# Patient Record
Sex: FEMALE | Race: WHITE | NOT HISPANIC OR LATINO | Employment: FULL TIME | URBAN - METROPOLITAN AREA
[De-identification: names, ages, dates, MRNs, and addresses within clinical notes are randomized per-mention and may not be internally consistent; named-entity substitution may affect disease eponyms.]

---

## 2018-06-15 ENCOUNTER — OFFICE VISIT - RIVER FALLS (OUTPATIENT)
Dept: FAMILY MEDICINE | Facility: CLINIC | Age: 43
End: 2018-06-15

## 2018-06-15 ASSESSMENT — MIFFLIN-ST. JEOR: SCORE: 1222.52

## 2018-06-18 ENCOUNTER — AMBULATORY - RIVER FALLS (OUTPATIENT)
Dept: FAMILY MEDICINE | Facility: CLINIC | Age: 43
End: 2018-06-18

## 2018-09-08 ENCOUNTER — OFFICE VISIT - RIVER FALLS (OUTPATIENT)
Dept: FAMILY MEDICINE | Facility: CLINIC | Age: 43
End: 2018-09-08

## 2018-09-15 ENCOUNTER — OFFICE VISIT - RIVER FALLS (OUTPATIENT)
Dept: FAMILY MEDICINE | Facility: CLINIC | Age: 43
End: 2018-09-15

## 2019-07-18 ENCOUNTER — AMBULATORY - RIVER FALLS (OUTPATIENT)
Dept: FAMILY MEDICINE | Facility: CLINIC | Age: 44
End: 2019-07-18

## 2019-07-29 ENCOUNTER — OFFICE VISIT - RIVER FALLS (OUTPATIENT)
Dept: FAMILY MEDICINE | Facility: CLINIC | Age: 44
End: 2019-07-29

## 2019-07-29 ASSESSMENT — MIFFLIN-ST. JEOR: SCORE: 1287.83

## 2019-09-25 ENCOUNTER — AMBULATORY - RIVER FALLS (OUTPATIENT)
Dept: FAMILY MEDICINE | Facility: CLINIC | Age: 44
End: 2019-09-25

## 2019-12-30 ENCOUNTER — COMMUNICATION - RIVER FALLS (OUTPATIENT)
Dept: FAMILY MEDICINE | Facility: CLINIC | Age: 44
End: 2019-12-30

## 2020-01-30 ENCOUNTER — AMBULATORY - RIVER FALLS (OUTPATIENT)
Dept: FAMILY MEDICINE | Facility: CLINIC | Age: 45
End: 2020-01-30

## 2021-08-12 ENCOUNTER — OFFICE VISIT - RIVER FALLS (OUTPATIENT)
Dept: FAMILY MEDICINE | Facility: CLINIC | Age: 46
End: 2021-08-12

## 2021-08-21 ENCOUNTER — OFFICE VISIT - RIVER FALLS (OUTPATIENT)
Dept: FAMILY MEDICINE | Facility: CLINIC | Age: 46
End: 2021-08-21

## 2022-02-12 VITALS
HEART RATE: 60 BPM | DIASTOLIC BLOOD PRESSURE: 64 MMHG | BODY MASS INDEX: 23.92 KG/M2 | WEIGHT: 135 LBS | SYSTOLIC BLOOD PRESSURE: 106 MMHG | HEIGHT: 63 IN | TEMPERATURE: 97 F

## 2022-02-12 VITALS
DIASTOLIC BLOOD PRESSURE: 60 MMHG | HEART RATE: 69 BPM | OXYGEN SATURATION: 98 % | WEIGHT: 147 LBS | SYSTOLIC BLOOD PRESSURE: 100 MMHG | RESPIRATION RATE: 16 BRPM

## 2022-02-12 VITALS
OXYGEN SATURATION: 98 % | WEIGHT: 141.6 LBS | TEMPERATURE: 98.7 F | DIASTOLIC BLOOD PRESSURE: 70 MMHG | HEART RATE: 76 BPM | SYSTOLIC BLOOD PRESSURE: 112 MMHG | SYSTOLIC BLOOD PRESSURE: 104 MMHG | TEMPERATURE: 97.7 F | DIASTOLIC BLOOD PRESSURE: 60 MMHG | BODY MASS INDEX: 25.28 KG/M2 | HEART RATE: 71 BPM

## 2022-02-12 VITALS
DIASTOLIC BLOOD PRESSURE: 71 MMHG | HEART RATE: 68 BPM | WEIGHT: 150.8 LBS | TEMPERATURE: 98.2 F | BODY MASS INDEX: 26.93 KG/M2 | OXYGEN SATURATION: 97 % | SYSTOLIC BLOOD PRESSURE: 114 MMHG

## 2022-02-12 VITALS
HEART RATE: 66 BPM | DIASTOLIC BLOOD PRESSURE: 64 MMHG | BODY MASS INDEX: 26.47 KG/M2 | SYSTOLIC BLOOD PRESSURE: 96 MMHG | WEIGHT: 149.4 LBS | HEIGHT: 63 IN | TEMPERATURE: 98.3 F

## 2022-02-15 NOTE — PROGRESS NOTES
Patient:   JENNIFER CAST            MRN: [id number]            FIN: [id number]               Age:   43 years     Sex:  Female     :  1975   Associated Diagnoses:   Pre-employment examination   Author:   Penelope Turner      Subjective   Chief complaint 6/15/2018 4:02 PM CDT    Pt here for pre-employment px- RFSD, TB test.  .     see copy of h and P      Health Status   Allergies:    Allergic Reactions (Selected)  Severity Not Documented  Apples (Throat swelling, eye swelling)  Hercules (Throat swelling, eye swelling)  Peaches (Throat swelling, eye swelling)   Medications:  (Selected)   Documented Medications  Documented  Multiple Vitamins oral capsule: 1 cap(s), PO, Daily, # 90 cap(s), 0 Refill(s), Type: Maintenance  Probiotic Formula (Bacillus Coagulans) oral capsule: 1 cap(s), po, daily, 0 Refill(s), Type: Maintenance  Remicade 100 mg intravenous injection: iv, 0 Refill(s), Type: Maintenance  azaTHIOprine: daily, 0 Refill(s), Type: Maintenance  calcium (as calcium citrate) 250 mg oral tablet: 1 tab(s) ( 250 mg ), po, bid, 0 Refill(s), Type: Maintenance   Problem list:    No problem items selected or recorded.      Objective   Vital Signs   6/15/2018 4:02 PM CDT Temperature Tympanic 97.0 DegF  LOW    Peripheral Pulse Rate 60 bpm    Pulse Site Radial artery    HR Method Manual    Systolic Blood Pressure 106 mmHg    Diastolic Blood Pressure 64 mmHg    Mean Arterial Pressure 78 mmHg    BP Site Right arm    BP Method Manual      Measurements from flowsheet : Measurements   6/15/2018 4:02 PM CDT Height Measured - Standard 62.75 in    Weight Measured - Standard 135.0 lb    BSA 1.64 m2    Body Mass Index 24.1 kg/m2         Impression and Plan   Assessment and Plan:       Diagnosis: Pre-employment examination ([id number]-UC Z02.1).

## 2022-02-15 NOTE — LETTER
(Inserted Image. Unable to display)     January 20, 2020      JENNIFER CAST  [address], [place] [id number]          Dear JENNIFER,      Thank you for selecting [facility] (previously [facility]) for your healthcare needs.      Our records indicate you are due for the following services:     Immunizations:  Hep A #2      Hep B #3      To schedule an appointment or if you have further questions, please contact your primary clinic:   [facility]       [phone]   [facility]       [phone]              [facility]     [phone]      Powered by [employer] and [employer]    Sincerely,    Delta Clifton MD

## 2022-02-15 NOTE — PROGRESS NOTES
Patient:   JENNIFER CAST            MRN: [id number]            FIN: [id number]               Age:   44 years     Sex:  Female     :  1975   Associated Diagnoses:   Soft corn   Author:   Clarke Torre PA-C      Chief Complaint   2019 2:19 PM CDT    Pt c/o plantar's wart on bottom of right foot x awhile.        History of Present Illness   Chief complaint and symptoms noted above and confirmed with patient   intermittent pain on bottom of right foot,  thinks it may be a plantar wart      Health Status   Allergies:    Allergic Reactions (All)  Severity Not Documented  Apples (Throat swelling, eye swelling)  Hercules (Throat swelling, eye swelling)  Peaches (Throat swelling, eye swelling)   Medications:  (Selected)   Prescriptions  Prescribed  Atrovent 42 mcg/inh nasal spray: 2 spray(s), nasal, qid, PRN: for nasal congestion, # 1 EA, 2 Refill(s), Type: Maintenance, Pharm[id number], 2 spray(s) Nasal qid,PRN:for nasal congestion  Documented Medications  Documented  Melatonin 1 mg oral tablet: = 1 tab(s) ( 1 mg ), Oral, hs, 0 Refill(s), Type: Maintenance  Multiple Vitamins oral capsule: 1 cap(s), PO, Daily, # 90 cap(s), 0 Refill(s), Type: Maintenance  Remicade 100 mg intravenous injection: iv, 0 Refill(s), Type: Maintenance  Vitamin D3: 1 tab, Oral, daily, 0 Refill(s), Type: Maintenance  azaTHIOprine: daily, 0 Refill(s), Type: Maintenance  calcium (as calcium citrate) 250 mg oral tablet: 1 tab(s) ( 250 mg ), po, bid, 0 Refill(s), Type: Maintenance   Problem list:    All Problems  Crohns disease / SNOMED CT 65361936 / Confirmed      Histories   Past Medical History:    No active or resolved past medical history items have been selected or recorded.   Family History:    No family history items have been selected or recorded.   Procedure history:    No active procedure history items have been selected or recorded.      Physical Examination   Vital Signs   2019 2:19 PM CDT Temperature  Tympanic 98.3 DegF    Peripheral Pulse Rate 66 bpm    Systolic Blood Pressure 96 mmHg    Diastolic Blood Pressure 64 mmHg    Mean Arterial Pressure 75 mmHg    BP Site Right arm    BP Method Electronic      Measurements from flowsheet : Measurements   7/29/2019 2:19 PM CDT Height Measured - Standard 62.75 in    Weight Measured - Standard 149.4 lb    BSA 1.73 m2    Body Mass Index 26.67 kg/m2  HI      General:  No acute distress.    Integumentary:  on bottom of right heal, there is a small callous present, this is debrided with a 15 blade to reveal a small soft corn.       Impression and Plan   Diagnosis     Soft corn ([id number] L84).     Summary:  advised to use a donut pad to relieve the pressure, if not effective can use salicylic acid plaster  follow up in clinic for more aggressive treatment if needed.    Orders     Orders   Charges (Evaluation and Management):  53308 office outpatient visit 15 minutes (Charge) (Order): Quantity: 1, Soft corn.

## 2022-02-15 NOTE — PROGRESS NOTES
"   Patient:   JENNIFER CAST            MRN: [id number]            FIN: [id number]               Age:   43 years     Sex:  Female     :  1975   Associated Diagnoses:   Eustachian tube dysfunction   Author:   Clarke Torre PA-C      Chief Complaint   2018 8:06 AM CDT     Left ear feels plugged, causing increased headaches.  Ongoing x 1 week.      History of Present Illness   Chief complaint as above reviewed with patient. She reports 1 week of left ear \"plugged\" sensation without pain. No drainage. No right ear pain or fullness. Notes increasing intermittent frontal headaches x 1 week as well. 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 No visual changes, nausea, vomiting, photosensitivity.       Review of Systems   Constitutional:  Negative except as documented in history of present illness.    Eye:  Negative.    Ear/Nose/Mouth/Throat:  Negative except as documented in history of present illness.    Respiratory:  Negative.    Cardiovascular:  Negative.    Gastrointestinal:  Negative.    Musculoskeletal:  Negative.    Integumentary:  Negative.    Neurologic:  Negative.    Psychiatric:  Negative.       Health Status   Allergies:    Allergic Reactions (Selected)  Severity Not Documented  Apples (Throat swelling, eye swelling)  Hercules (Throat swelling, eye swelling)  Peaches (Throat swelling, eye swelling)   Medications:  (Selected)   Documented Medications  Documented  Multiple Vitamins oral capsule: 1 cap(s), PO, Daily, # 90 cap(s), 0 Refill(s), Type: Maintenance  Probiotic Formula (Bacillus Coagulans) oral capsule: 1 cap(s), po, daily, 0 Refill(s), Type: Maintenance  Remicade 100 mg intravenous injection: iv, 0 Refill(s), Type: Maintenance  azaTHIOprine: daily, 0 Refill(s), Type: Maintenance  calcium (as calcium citrate) 250 mg oral tablet: 1 tab(s) ( 250 mg ), po, bid, 0 Refill(s), Type: Maintenance   Problem list:    All Problems  Crohns disease / SNOMED CT 19810584 / Confirmed    "   Histories   Past Medical History:    No active or resolved past medical history items have been selected or recorded.   Family History:    No family history items have been selected or recorded.   Procedure history:    No active procedure history items have been selected or recorded.   Social History:             No active social history items have been recorded.      Physical Examination   Vital Signs   9/8/2018 8:06 AM CDT Temperature Tympanic 97.7 DegF  LOW    Peripheral Pulse Rate 71 bpm    Systolic Blood Pressure 104 mmHg    Diastolic Blood Pressure 60 mmHg    Mean Arterial Pressure 75 mmHg    Oxygen Saturation 98 %      Measurements from flowsheet : Measurements   9/8/2018 8:06 AM CDT     Weight Measured - Standard                141.6 lb     General:  No acute distress.    Eye:  Pupils are equal, round and reactive to light, Extraocular movements are intact, Normal conjunctiva.    HENT:  No pharyngeal erythema, No sinus tenderness, Right TM clear. Left TM mildly obstructed with cerumen, otherwise clear. , cannot do Valsalva manuever to get ears to pop.    Respiratory:  Lungs are clear to auscultation.    Cardiovascular:  Normal rate, Regular rhythm, No murmur.    Integumentary:  Warm, Dry.    Psychiatric:  Appropriate mood & affect.       Impression and Plan   Diagnosis     Eustachian tube dysfunction (YFW92-ZZ H69.80).     Orders     Orders   Pharmacy:  Atrovent 42 mcg/inh nasal spray (Prescribe): 2 spray(s), nasal, qid, PRN: for nasal congestion, # 1 EA, 2 Refill(s), Type: Maintenance, Pharmacy: [id number], 2 spray(s) Nasal qid,PRN:for nasal congestion.     Orders   Charges (Evaluation and Management):  32643 office outpatient visit 15 minutes (Charge) (Order): Quantity: 1, Eustachian tube dysfunction.

## 2022-02-15 NOTE — NURSING NOTE
Comprehensive Intake Entered On:  7/29/2019 2:24 PM CDT    Performed On:  7/29/2019 2:19 PM CDT by Venessa Dc CMA               Summary   Chief Complaint :   Pt c/o plantar's wart on bottom of right foot x awhile.    Weight Measured :   149.4 lb(Converted to: 149 lb 6 oz, 67.77 kg)    Height Measured :   62.75 in(Converted to: 5 ft 3 in, 159.38 cm)    Body Mass Index :   26.67 kg/m2 (HI)    Body Surface Area :   1.73 m2   Systolic Blood Pressure :   96 mmHg   Diastolic Blood Pressure :   64 mmHg   Mean Arterial Pressure :   75 mmHg   Peripheral Pulse Rate :   66 bpm   BP Site :   Right arm   BP Method :   Electronic   Temperature Tympanic :   98.3 DegF(Converted to: 36.8 DegC)    Venessa Dc CMA - 7/29/2019 2:19 PM CDT   Health Status   Allergies Verified? :   Yes   Medication History Verified? :   Yes   Medical History Verified? :   No   Pre-Visit Planning Status :   Completed   Tobacco Use? :   Never smoker   Venesas Dc CMA - 7/29/2019 2:19 PM CDT   Consents   Consent for Immunization Exchange :   Consent Granted   Consent for Immunizations to Providers :   Consent Granted   Venessa Dc CMA - 7/29/2019 2:19 PM CDT   Meds / Allergies   (As Of: 7/29/2019 2:24:49 PM CDT)   Allergies (Active)   Apples  Estimated Onset Date:   Unspecified ; Reactions:   throat swelling, eye swelling ; Created By:   Barbara Palacio; Reaction Status:   Active ; Category:   Food ; Substance:   Apples ; Type:   Allergy ; Updated By:   Barbara Palacio; Reviewed Date:   9/15/2018 8:26 AM CDT      Cherry  Estimated Onset Date:   Unspecified ; Reactions:   throat swelling, eye swelling ; Created By:   Barbara Palacio; Reaction Status:   Active ; Category:   Food ; Substance:   Cherry ; Type:   Allergy ; Updated By:   Barbara Palacio; Reviewed Date:   9/15/2018 8:26 AM CDT      Peaches  Estimated Onset Date:   Unspecified ; Reactions:   throat swelling, eye swelling ; Created By:   Barbara Palacio; Reaction  Status:   Active ; Category:   Food ; Substance:   Peaches ; Type:   Allergy ; Updated By:   Barbara Palacio; Reviewed Date:   9/15/2018 8:26 AM CDT        Medication List   (As Of: 7/29/2019 2:24:49 PM CDT)   Prescription/Discharge Order    ipratropium nasal  :   ipratropium nasal ; Status:   Prescribed ; Ordered As Mnemonic:   Atrovent 42 mcg/inh nasal spray ; Simple Display Line:   2 spray(s), nasal, qid, PRN: for nasal congestion, 1 EA, 2 Refill(s) ; Ordering Provider:   Clarke Torre PA-C; Catalog Code:   ipratropium nasal ; Order Dt/Tm:   9/8/2018 8:30:41 AM            Home Meds    azaTHIOprine  :   azaTHIOprine ; Status:   Documented ; Ordered As Mnemonic:   azaTHIOprine ; Simple Display Line:   daily, 0 Refill(s) ; Catalog Code:   azaTHIOprine ; Order Dt/Tm:   6/15/2018 4:13:52 PM          bacillus coagulans-inulin  :   bacillus coagulans-inulin ; Status:   Processing ; Ordered As Mnemonic:   Probiotic Formula (Bacillus Coagulans) oral capsule ; Action Display:   Complete ; Catalog Code:   bacillus coagulans-inulin ; Order Dt/Tm:   7/29/2019 2:23:24 PM          calcium citrate  :   calcium citrate ; Status:   Documented ; Ordered As Mnemonic:   calcium (as calcium citrate) 250 mg oral tablet ; Simple Display Line:   250 mg, 1 tab(s), po, bid, 0 Refill(s) ; Catalog Code:   calcium citrate ; Order Dt/Tm:   6/15/2018 4:14:08 PM          cholecalciferol  :   cholecalciferol ; Status:   Documented ; Ordered As Mnemonic:   Vitamin D3 ; Simple Display Line:   1 tab, Oral, daily, 0 Refill(s) ; Catalog Code:   cholecalciferol ; Order Dt/Tm:   7/29/2019 2:23:06 PM          inFLIXimab  :   inFLIXimab ; Status:   Documented ; Ordered As Mnemonic:   Remicade 100 mg intravenous injection ; Simple Display Line:   iv, 0 Refill(s) ; Catalog Code:   inFLIXimab ; Order Dt/Tm:   6/15/2018 4:13:27 PM          melatonin  :   melatonin ; Status:   Documented ; Ordered As Mnemonic:   Melatonin 1 mg oral tablet ; Simple Display  Line:   1 mg, 1 tab(s), Oral, hs, 0 Refill(s) ; Catalog Code:   melatonin ; Order Dt/Tm:   7/29/2019 2:22:47 PM          multivitamin  :   multivitamin ; Status:   Documented ; Ordered As Mnemonic:   Multiple Vitamins oral capsule ; Simple Display Line:   1 cap(s), PO, Daily, 90 cap(s), 0 Refill(s) ; Catalog Code:   multivitamin ; Order Dt/Tm:   6/15/2018 4:14:01 PM

## 2022-02-15 NOTE — TELEPHONE ENCOUNTER
---------------------  From: Lindsay BLANCAS, Leesa CAPONE   Sent: 7/24/2019 10:17:05 AM CDT  Subject: [facility] request     Fax was received from [facility] for records of labs to be faxed back to them at [phone].  We have no records of labs being done here.  I called Bettye at [facility] at [phone] and let her know.  She had no further questions and was thankful for the call.

## 2022-02-15 NOTE — TELEPHONE ENCOUNTER
---------------------  From: Dee Hackett (Phone Messages Pool ([id number]_[facility]))   Sent: 12/30/2019 12:55:48 PM CST  Subject: Immunization Records     Phone Message    PCP:   None      Time of Call:  12:41pm       Person Calling:  Jenae  Phone number:  [phone]    Note:   Patient calling with questions about immunizations.     I spoke with patient at 12:50pm.  She would like her immunization records sent to her Crohn's doctor.  She is also wondering when she is due for her next immunizations.     Immunization record to be faxed to:   Dr. Santosh Grimes with [facility]  Fax: [phone]    Rec faxed.

## 2022-02-15 NOTE — PROGRESS NOTES
Chief Complaint    Pt c/o burning, super itchy and swollen, denies discharge x 4 or 5 days. She just finished a 3 day monistat tx. She has been using vagisil cream which is not working.  History of Present Illness      Patient presents to clinic today with vaginal itching and discharge.       She has had similar symptoms in the past and theses were found to be yeast infections.       She has no new partners, no pelvic pain, no lesions.      She has used a monistat otc for the past 2 nights      ROS: neg except as HPI      Exam:      General: alert and oriented ×3 no acute distress.      HEENT: pupils are equal round and reactive to light extraocular motion is intact. Normocephalic and atraumatic.       Hearing is grossly normal and there is no otorrhea.       Nares are patent there is no rhinorrhea.       Mucous membranes are moist and pink.      Chest: has bilateral rise with no increased work of breathing.      Cardiovascular: normal perfusion and brisk capillary refill.      Musculoskeletal: no gross focal abnormalities and normal gait.      Neuro: no gross focal abnormalities and memory seems intact.      Psychiatric: speech is clear and coherent and fluent. Patient dressed appropriately for the weather. Mood is appropriate and affect is full.  Physical Exam   Vitals & Measurements    HR: 69 (Peripheral)  RR: 16  BP: 100/60  SpO2: 98%     WT: 147 lb   Assessment/Plan       Vaginal itching (N89.8)        will try emperical diflucan, if no imrpovement then rtc for further eval and treatment, offered pelvic exam today, pt declined         Ordered:          fluconazole, See Instructions, Instructions: Take one tablet and repeat in 3 days, # 2 tab(s), 1 Refill(s), Type: Soft Stop, Pharmacy[id number], Take one tablet and repeat in 3 days, 147, lb, 08/12/21 8:48:00 CDT, Weight Measured, (Ordered)           Patient Information     Name:JENNIFER CAST      Address:      [address] [place] [address],  [place] [id number]     Sex:Female     YOB: 1975     [phone]     Emergency Contact:CHELO CAST     MRN:[id number]     FIN:[id number]     Location:[facility]     Date of Service:08/12/2021      Primary Care Physician:       NONE ,       Attending Physician:       Jennyfer Villafuerte MD, [phone]  Problem List/Past Medical History    Ongoing     Crohns disease    Historical     No qualifying data  Medications    Atrovent 42 mcg/inh nasal spray, 2 spray(s), Nasal, qid, PRN, 2 refills    calcium (as calcium citrate) 250 mg oral tablet, 250 mg= 1 tab(s), Oral, bid    Diflucan 150 mg oral tablet, See Instructions, 1 refills    escitalopram 20 mg oral tablet    Humira, 40 mg, Subcutaneous, q2 wks    Humira Pen 40 mg/0.4 mL subcutaneous kit    Multiple Vitamins oral capsule, 1 cap(s), Oral, daily    mupirocin 2% topical ointment    traZODone 50 mg oral tablet    Vitamin D3, 1 tab, Oral, daily  Allergies    Apples (throat swelling, eye swelling)    Hercules (throat swelling, eye swelling)    Peaches (throat swelling, eye swelling)  Social History    Smoking Status     Never smoker     Electronic Cigarette/Vaping      Electronic Cigarette Use: Never.     Tobacco      Never (less than 100 in lifetime)  Immunizations       Scheduled Immunizations       Dose Date(s)       influenza virus vaccine, inactivated       10/12/2004, 10/03/2005, 12/05/2011, 11/06/2012, 12/02/2013, 10/09/2014, 10/13/2015, 10/12/2016, 10/11/2018       MMR (measles/mumps/rubella)       04/19/1994       pneumococcal (PPSV23)       11/23/2001, 10/12/2004       tetanus/diphth/pertuss (Tdap) adult/adol       03/31/2009, 09/10/2012, 04/29/2015       Other Immunizations               hepatitis A adult vaccine       07/18/2019, 01/30/2020       hepatitis B adult vaccine       07/18/2019, 09/25/2019, 01/30/2020       zoster vaccine, inactivated       07/18/2019, 01/30/2020

## 2022-02-15 NOTE — TELEPHONE ENCOUNTER
Entered by Ericka Zarate MA on December 06, 2021 9:09:26 AM CST  ---------------------  From: Ericka Zarate MA   To: [facility]    Sent: 12/6/2021 9:09:26 AM CST  Subject: Medication Management     ** Not Approved: Prescriber not associated with location **  traZODone (TRAZODONE HYDROCHLORIDE 50MG TABLET)  TAKE TWO TABLETS (100 MG) BY MOUTH AT BEDTIME.  Qty:  180 tab(s)        Days Supply:  90        Refills:  1          Substitutions Allowed     Route To Pharmacy - [facility]   Signed by Ericka Zarate MA            ** Not Approved: Prescriber not associated with location **  traZODone (TRAZODONE HYDROCHLORIDE 50MG TABLET)  TAKE TWO TABLETS (100 MG) BY MOUTH AT BEDTIME.  Qty:  180 tab(s)        Days Supply:  90        Refills:  0          Substitutions Allowed     Route To Pharmacy - [facility]   Note from Pharmacy:  SHE WOULD ALSO LIKE A REFILL ON  BETAMETHASONE, I CAN'T FIND AN RX ON FILE.   [facility]  Signed by Ericka Zarate MA            ** Patient matched by Ericka Zarate MA on 12/6/2021 9:08:40 AM CST **      ------------------------------------------  From: [facility]  To: Nel Garay MD  Sent: December 6, 2021 8:04:06 AM CST  Subject: Medication Management  Due: November 16, 2021 3:34:48 PM CST     ** On Hold Pending Signature **     Drug: traZODone (traZODone 50 mg oral tablet), TAKE TWO TABLETS (100 MG) BY MOUTH AT BEDTIME.  Quantity: 180 tab(s)  Days Supply: 90  Refills: 1  Substitutions Allowed  Notes from Pharmacy:     Dispensed Drug: traZODone (traZODone 50 mg oral tablet), TAKE TWO TABLETS (100 MG) BY MOUTH AT BEDTIME.  Quantity: 180 tab(s)  Days Supply: 90  Refills: 1  Substitutions Allowed  Notes from Pharmacy:     ** On Hold Pending Signature **     Drug: traZODone (traZODone 50 mg oral tablet), TAKE TWO TABLETS (100 MG) BY MOUTH AT BEDTIME.  Quantity: 180 tab(s)  Days Supply: 90  Refills: 0  Substitutions Allowed  Notes from Pharmacy: SHE WOULD ALSO LIKE A REFILL  ON BETAMETHASONE, I CAN T FIND AN RX ON FILE. THANKS SILVESTRE     Dispensed Drug: traZODone (traZODone 50 mg oral tablet), TAKE TWO TABLETS (100 MG) BY MOUTH AT BEDTIME.  Quantity: 180 tab(s)  Days Supply: 90  Refills: 0  Substitutions Allowed  Notes from Pharmacy: SHE WOULD ALSO LIKE A REFILL ON BETAMETHASONE, I CAN T FIND AN RX ON FILE. THANKS SILVESTRE  ------------------------------------------

## 2022-02-15 NOTE — PROGRESS NOTES
Chief Complaint    Ongoing vaginal itching. No discharge, swelling or pain. Wet prep test from [facility] was negative.  History of Present Illness       Patient is a 46-year-old female who comes in with concerns about vaginal itching that has been ongoing.       She was seen on August 19 at a different clinic and shows me her lab results on the phone.  Her wet prep was completely negative.  They had placed her on metronidazole 500 mg twice a day for 7 days while awaiting test results.  She took 2 doses but has now stopped since the tests were negative.       She took Diflucan on August 12 and repeated it on August 15 for the vaginal itching but it did not resolve the issue.  She has not had any vaginal discharge.  She did initially have some swelling but that has gone down.  The area just seems very dry.  She has no concern about sexually transmitted infections.  Her  has had a vasectomy and she uses a Mirena.  She has no new products except for a change in the scent of her Dove soap and that she has been starting to use bleach on her sheets.  She does not wear underwear when she is wearing her bike shorts or exercise pants.       Her daughter was just positive for strep today and she does have a scratchy throat.  She would like antibiotics.  Review of Systems       Negative except as listed in HPI  Physical Exam   Vitals & Measurements    T: 98.2  F (Tympanic)  HR: 68 (Peripheral)  BP: 114/71  SpO2: 97%     WT: 150.8 lb        Vitals noted and within normal limits        exam with normal external female genitalia.  Dryness but no erythema or rash.  No discharge noted.  Couple of small skin tears from scratching.  Assessment/Plan       Vaginal dryness (N89.8)        Recommended using Vaseline or Aquaphor to the external genitalia including the labia.  Could also try 1% hydrocortisone over-the-counter once daily for up to 2 weeks.  If this does not resolve recommended following up either with her primary care  physician or a GYN doctor to consider estrogen treatment for the dryness.        Recommended stopping the bleach on the sheets and going back to her original Dove scented soap       Orders:         amoxicillin, = 1 tab(s) ( 500 mg ), Oral, tid, x 10 day(s), # 30 tab(s), 0 Refill(s), Type: Acute, Pharmacy: [facility] [place], 1 tab(s) Oral tid,x10 day(s), 150.8, lb, 08/21/21 10:32:00 CDT, Weight Measured, (Ordered)       Treated with amoxicillin.  The nurses sent in the order as per protocol.          Patient Information     Name:JENNIFER CAST      Address:      [address] [place] [id number]     Sex:Female     YOB: 1975     [phone]     Emergency Contact:CHELO CAST     MRN:[id number]     FIN:[id number]     Location:[facility]     Date of Service:08/21/2021      Primary Care Physician:       NONE ,       Attending Physician:       Anyi Curtis MD, [phone]  Problem List/Past Medical History    Ongoing     Crohns disease    Historical     No qualifying data  Medications    amoxicillin 500 mg oral tablet, 500 mg= 1 tab(s), Oral, tid    Atrovent 42 mcg/inh nasal spray, 2 spray(s), Nasal, qid, PRN, 2 refills    calcium (as calcium citrate) 250 mg oral tablet, 250 mg= 1 tab(s), Oral, bid    dexamethasone/neomycin/polymyxin B 1 mg-3.5 mg-10,000 units/mL ophthalmic suspension, 1 drop(s), Eye-Left, q6 hrs    Diflucan 150 mg oral tablet, See Instructions, 1 refills    escitalopram 20 mg oral tablet    Humira, 40 mg, Subcutaneous, q2 wks    Humira Pen 40 mg/0.4 mL subcutaneous kit    ketorolac 0.5% ophthalmic solution, 1 drop(s), Eye-Left, tid, PRN    Mirena, Intrauteral, once    Multiple Vitamins oral capsule, 1 cap(s), Oral, daily    mupirocin 2% topical ointment    traZODone 50 mg oral tablet    Vitamin D3, 1 tab, Oral, daily  Allergies    Apples (throat swelling, eye swelling)    Hercules (throat swelling, eye swelling)    No Known Medication Allergies    Peaches (throat  swelling, eye swelling)  Social History    Smoking Status     Never smoker     Electronic Cigarette/Vaping      Electronic Cigarette Use: Never.     Tobacco      Never (less than 100 in lifetime)  Immunizations       Scheduled Immunizations       Dose Date(s)       influenza virus vaccine, inactivated       10/12/2004, 10/03/2005, 12/05/2011, 11/06/2012, 12/02/2013, 10/09/2014, 10/13/2015, 10/12/2016, 10/11/2018       MMR (measles/mumps/rubella)       04/19/1994       pneumococcal (PPSV23)       11/23/2001, 10/12/2004       tetanus/diphth/pertuss (Tdap) adult/adol       03/31/2009, 09/10/2012, 04/29/2015       Other Immunizations               hepatitis A adult vaccine       07/18/2019, 01/30/2020       hepatitis B adult vaccine       07/18/2019, 09/25/2019, 01/30/2020       zoster vaccine, inactivated       07/18/2019, 01/30/2020

## 2022-02-15 NOTE — TELEPHONE ENCOUNTER
Entered by Ericka Zarate MA on May 24, 2021 12:33:57 PM CDT  ---------------------  From: Ericka Zarate MA   To: [facility]    Sent: 5/24/2021 12:33:57 PM CDT  Subject: Medication Management     ** Not Approved: Prescriber not associated with location **  traZODone (TRAZODONE HYDROCHLORIDE 50MG TABLET)  TAKE ONE TABLET BY MOUTH AT BEDTIME IF NEEDED FOR SLEEP.  Qty:  90 EA        Days Supply:  90        Refills:  0          Substitutions Allowed     Route To Pharmacy - [facility]   Signed by Ericka Zarate MA            ** Patient matched by Ericka Zarate MA on 5/24/2021 12:33:26 PM CDT **      ------------------------------------------  From: [facility]  To: Nel Garay MD  Sent: May 24, 2021 11:46:00 AM CDT  Subject: Medication Management  Due: May 14, 2021 8:11:22 PM CDT     ** On Hold Pending Signature **     Drug: traZODone (traZODone 50 mg oral tablet), TAKE ONE TABLET BY MOUTH AT BEDTIME IF NEEDED FOR SLEEP.  Quantity: 90 EA  Days Supply: 90  Refills: 0  Substitutions Allowed  Notes from Pharmacy:     Dispensed Drug: traZODone (traZODone 50 mg oral tablet), TAKE ONE TABLET BY MOUTH AT BEDTIME IF NEEDED FOR SLEEP.  Quantity: 90 EA  Days Supply: 90  Refills: 0  Substitutions Allowed  Notes from Pharmacy:  ------------------------------------------

## 2022-02-15 NOTE — NURSING NOTE
Comprehensive Intake Entered On:  8/12/2021 8:45 AM CDT    Performed On:  8/12/2021 8:37 AM CDT by Sallie Sampson               Summary   Weight Measured :   147 lb(Converted to: 147 lb 0 oz, 66.678 kg)    Sallie Sampson - 8/12/2021 8:48 AM CDT   Chief Complaint :   Pt c/o burning, super itchy and swollen, denies discharge x 4 or 5 days. She just finished a 3 day monistat tx. She has been using vagisil cream which is not working.   Systolic Blood Pressure :   100 mmHg   Diastolic Blood Pressure :   60 mmHg   Mean Arterial Pressure :   73 mmHg   Peripheral Pulse Rate :   69 bpm   BP Site :   Right arm   BP Method :   Manual   Respiratory Rate :   16 br/min   Oxygen Saturation :   98 %   Sallie Sampson - 8/12/2021 8:37 AM CDT   Health Status   Allergies Verified? :   Yes   Medication History Verified? :   Yes   Tobacco Use? :   Never smoker   Sallie Sampson - 8/12/2021 8:37 AM CDT   Consents   Consent for Immunization Exchange :   Consent Granted   Consent for Immunizations to Providers :   Consent Granted   Sallie Sampson - 8/12/2021 8:37 AM CDT   Meds / Allergies   (As Of: 8/12/2021 8:45:47 AM CDT)   Allergies (Active)   Apples  Estimated Onset Date:   Unspecified ; Reactions:   throat swelling, eye swelling ; Created By:   Barbara Palacio; Reaction Status:   Active ; Category:   Food ; Substance:   Apples ; Type:   Allergy ; Updated By:   Barbara Palacio; Reviewed Date:   8/12/2021 8:37 AM CDT      Cherry  Estimated Onset Date:   Unspecified ; Reactions:   throat swelling, eye swelling ; Created By:   Barbara Palacio; Reaction Status:   Active ; Category:   Food ; Substance:   Cherry ; Type:   Allergy ; Updated By:   Barbara Palacio; Reviewed Date:   8/12/2021 8:37 AM CDT      Peaches  Estimated Onset Date:   Unspecified ; Reactions:   throat swelling, eye swelling ; Created By:   Barbara Palacio; Reaction Status:   Active ; Category:   Food ; Substance:   Peaches ; Type:   Allergy ; Updated By:    Barbara Palacio; Reviewed Date:   8/12/2021 8:37 AM CDT        Medication List   (As Of: 8/12/2021 8:45:47 AM CDT)   Prescription/Discharge Order    ipratropium nasal  :   ipratropium nasal ; Status:   Prescribed ; Ordered As Mnemonic:   Atrovent 42 mcg/inh nasal spray ; Simple Display Line:   2 spray(s), nasal, qid, PRN: for nasal congestion, 1 EA, 2 Refill(s) ; Ordering Provider:   Clarke Torre PA-C; Catalog Code:   ipratropium nasal ; Order Dt/Tm:   9/8/2018 8:30:41 AM CDT            Home Meds    adalimumab  :   adalimumab ; Status:   Documented ; Ordered As Mnemonic:   Humira ; Simple Display Line:   40 mg, Subcutaneous, q2 wks, 0 Refill(s) ; Catalog Code:   adalimumab ; Order Dt/Tm:   8/12/2021 8:44:35 AM CDT          azaTHIOprine  :   azaTHIOprine ; Status:   Completed ; Ordered As Mnemonic:   azaTHIOprine ; Simple Display Line:   daily, 0 Refill(s) ; Catalog Code:   azaTHIOprine ; Order Dt/Tm:   6/15/2018 4:13:52 PM CDT          calcium citrate  :   calcium citrate ; Status:   Documented ; Ordered As Mnemonic:   calcium (as calcium citrate) 250 mg oral tablet ; Simple Display Line:   250 mg, 1 tab(s), po, bid, 0 Refill(s) ; Catalog Code:   calcium citrate ; Order Dt/Tm:   6/15/2018 4:14:08 PM CDT          cholecalciferol  :   cholecalciferol ; Status:   Documented ; Ordered As Mnemonic:   Vitamin D3 ; Simple Display Line:   1 tab, Oral, daily, 0 Refill(s) ; Catalog Code:   cholecalciferol ; Order Dt/Tm:   7/29/2019 2:23:06 PM CDT          inFLIXimab  :   inFLIXimab ; Status:   Completed ; Ordered As Mnemonic:   Remicade 100 mg intravenous injection ; Simple Display Line:   iv, 0 Refill(s) ; Catalog Code:   inFLIXimab ; Order Dt/Tm:   6/15/2018 4:13:27 PM CDT          melatonin  :   melatonin ; Status:   Completed ; Ordered As Mnemonic:   Melatonin 1 mg oral tablet ; Simple Display Line:   1 mg, 1 tab(s), Oral, hs, 0 Refill(s) ; Catalog Code:   melatonin ; Order Dt/Tm:   7/29/2019 2:22:47 PM CDT           multivitamin  :   multivitamin ; Status:   Documented ; Ordered As Mnemonic:   Multiple Vitamins oral capsule ; Simple Display Line:   1 cap(s), PO, Daily, 90 cap(s), 0 Refill(s) ; Catalog Code:   multivitamin ; Order Dt/Tm:   6/15/2018 4:14:01 PM CDT            Social History   Social History   (As Of: 8/12/2021 8:45:47 AM CDT)   Tobacco:        Never (less than 100 in lifetime)   (Last Updated: 8/12/2021 8:37:38 AM CDT by Sallie Sampson)          Electronic Cigarette/Vaping:        Electronic Cigarette Use: Never.   (Last Updated: 8/12/2021 8:37:41 AM CDT by Sallie Sampson)

## 2022-02-15 NOTE — NURSING NOTE
Comprehensive Intake Entered On:  8/21/2021 10:43 AM CDT    Performed On:  8/21/2021 10:32 AM CDT by Eve Villagomez CMA               Summary   Chief Complaint :   Ongoing vaginal itching. No discharge, swelling or pain. Wet prep test from [facility] was negative.    Weight Measured :   150.8 lb(Converted to: 150 lb 13 oz, 68.402 kg)    Systolic Blood Pressure :   114 mmHg   Diastolic Blood Pressure :   71 mmHg   Mean Arterial Pressure :   85 mmHg   Peripheral Pulse Rate :   68 bpm   BP Site :   Right arm   BP Method :   Manual   Temperature Tympanic :   98.2 DegF(Converted to: 36.8 DegC)    Oxygen Saturation :   97 %   Eve Villagomez CMA - 8/21/2021 10:32 AM CDT   Health Status   Allergies Verified? :   Yes   Medication History Verified? :   Yes   Medical History Verified? :   Yes   Pre-Visit Planning Status :   Completed   Tobacco Use? :   Never smoker   Eve Villagomez CMA - 8/21/2021 10:32 AM CDT   Consents   Consent for Immunization Exchange :   Consent Granted   Consent for Immunizations to Providers :   Consent Granted   Eve Villagomez CMA - 8/21/2021 10:32 AM CDT   Meds / Allergies   (As Of: 8/21/2021 10:43:49 AM CDT)   Allergies (Active)   Apples  Estimated Onset Date:   Unspecified ; Reactions:   throat swelling, eye swelling ; Created By:   Barbara Palacio; Reaction Status:   Active ; Category:   Food ; Substance:   Apples ; Type:   Allergy ; Updated By:   Barbara Palacio; Reviewed Date:   8/12/2021 8:37 AM CDT      Cherry  Estimated Onset Date:   Unspecified ; Reactions:   throat swelling, eye swelling ; Created By:   Barbara Palacio; Reaction Status:   Active ; Category:   Food ; Substance:   Cherry ; Type:   Allergy ; Updated By:   Barbara Palacio; Reviewed Date:   8/12/2021 8:37 AM CDT      No Known Medication Allergies  Estimated Onset Date:   Unspecified ; Created By:   Eve Villagomez CMA; Reaction Status:   Active ; Category:   Drug ; Substance:   No Known Medication Allergies ; Type:   Allergy  ; Updated By:   Eve Villagomez CMA; Reviewed Date:   8/21/2021 10:39 AM CDT      Peaches  Estimated Onset Date:   Unspecified ; Reactions:   throat swelling, eye swelling ; Created By:   Barbara Palacio; Reaction Status:   Active ; Category:   Food ; Substance:   Peaches ; Type:   Allergy ; Updated By:   Barbara Palacio; Reviewed Date:   8/12/2021 8:37 AM CDT        Medication List   (As Of: 8/21/2021 10:43:49 AM CDT)   Prescription/Discharge Order    fluconazole  :   fluconazole ; Status:   Prescribed ; Ordered As Mnemonic:   Diflucan 150 mg oral tablet ; Simple Display Line:   See Instructions, Take one tablet and repeat in 3 days, 2 tab(s), 1 Refill(s) ; Ordering Provider:   Jennyfer Villafuerte MD; Catalog Code:   fluconazole ; Order Dt/Tm:   8/12/2021 9:37:05 AM CDT          ipratropium nasal  :   ipratropium nasal ; Status:   Prescribed ; Ordered As Mnemonic:   Atrovent 42 mcg/inh nasal spray ; Simple Display Line:   2 spray(s), nasal, qid, PRN: for nasal congestion, 1 EA, 2 Refill(s) ; Ordering Provider:   Clarke Torre PA-C; Catalog Code:   ipratropium nasal ; Order Dt/Tm:   9/8/2018 8:30:41 AM CDT            Home Meds    adalimumab  :   adalimumab ; Status:   Documented ; Ordered As Mnemonic:   Humira Pen 40 mg/0.4 mL subcutaneous kit ; Simple Display Line:   0 Refill(s) ; Catalog Code:   adalimumab ; Order Dt/Tm:   8/12/2021 8:46:50 AM CDT ; Comment:   Responsible Provider: JERRY ANDUJAR          adalimumab  :   adalimumab ; Status:   Documented ; Ordered As Mnemonic:   Humira ; Simple Display Line:   40 mg, Subcutaneous, q2 wks, 0 Refill(s) ; Catalog Code:   adalimumab ; Order Dt/Tm:   8/12/2021 8:44:35 AM CDT          calcium citrate  :   calcium citrate ; Status:   Documented ; Ordered As Mnemonic:   calcium (as calcium citrate) 250 mg oral tablet ; Simple Display Line:   250 mg, 1 tab(s), po, bid, 0 Refill(s) ; Catalog Code:   calcium citrate ; Order Dt/Tm:   6/15/2018 4:14:08 PM CDT           cholecalciferol  :   cholecalciferol ; Status:   Documented ; Ordered As Mnemonic:   Vitamin D3 ; Simple Display Line:   1 tab, Oral, daily, 0 Refill(s) ; Catalog Code:   cholecalciferol ; Order Dt/Tm:   7/29/2019 2:23:06 PM CDT          dexamethasone/neomycin/polymyxin B ophthalmic  :   dexamethasone/neomycin/polymyxin B ophthalmic ; Status:   Documented ; Ordered As Mnemonic:   dexamethasone/neomycin/polymyxin B 1 mg-3.5 mg-10,000 units/mL ophthalmic suspension ; Simple Display Line:   1 drop(s), Eye-Left, q6 hrs, 0 Refill(s) ; Catalog Code:   dexamethasone/neomycin/polymyxin B ophth ; Order Dt/Tm:   8/21/2021 10:40:38 AM CDT          escitalopram  :   escitalopram ; Status:   Documented ; Ordered As Mnemonic:   escitalopram 20 mg oral tablet ; Simple Display Line:   0 Refill(s) ; Catalog Code:   escitalopram ; Order Dt/Tm:   8/12/2021 8:46:37 AM CDT ; Comment:   Responsible Provider: LUDIVINA LEWIS          ketorolac ophthalmic  :   ketorolac ophthalmic ; Status:   Documented ; Ordered As Mnemonic:   ketorolac 0.5% ophthalmic solution ; Simple Display Line:   1 drop(s), Eye-Left, tid, PRN: for itching, 5 mL, 0 Refill(s) ; Catalog Code:   ketorolac ophthalmic ; Order Dt/Tm:   8/21/2021 10:40:38 AM CDT          levonorgestrel  :   levonorgestrel ; Status:   Documented ; Ordered As Mnemonic:   Mirena ; Simple Display Line:   Intrauteral, once, 0 Refill(s) ; Catalog Code:   levonorgestrel ; Order Dt/Tm:   8/21/2021 10:41:59 AM CDT          multivitamin  :   multivitamin ; Status:   Documented ; Ordered As Mnemonic:   Multiple Vitamins oral capsule ; Simple Display Line:   1 cap(s), PO, Daily, 90 cap(s), 0 Refill(s) ; Catalog Code:   multivitamin ; Order Dt/Tm:   6/15/2018 4:14:01 PM CDT          mupirocin topical  :   mupirocin topical ; Status:   Documented ; Ordered As Mnemonic:   mupirocin 2% topical ointment ; Simple Display Line:   0 Refill(s) ; Catalog Code:   mupirocin topical ; Order Dt/Tm:   8/12/2021  8:47:06 AM CDT ; Comment:   Responsible Provider: DAVID SHIPLEY          trazodone  :   trazodone ; Status:   Documented ; Ordered As Mnemonic:   traZODone 50 mg oral tablet ; Simple Display Line:   0 Refill(s) ; Catalog Code:   traZODone ; Order Dt/Tm:   8/12/2021 8:47:01 AM CDT ; Comment:   Responsible Provider: LUDIVINA LEWIS

## 2022-02-15 NOTE — NURSING NOTE
Phone Message    PCP: TERI    Time of call: 12:07pm message left    Person calling: Jenae  Contact # : [phone]    MESSAGE: Pt calls wanting to verify when her next HepB is due. She received a letter in the mail stating she was due now but she had a note written to herself saying it was not due until 11/2019.    Last visit/reason: 7/29/19 - soft corn    12:10pm Called and spoke with pt. I did confirm that the HepB #2 is due now. She asked about the HepA and Shingles vaccine. I told her that she should return in 1/2020 to receive HepA #2 and the second Shingrix. Pt was transferred to scheduling.

## 2022-02-15 NOTE — LETTER
(Inserted Image. Unable to display)       September 19, 2019      JENNIFER CAST  [address], [place] [id number]          Dear JENNIFER,      Thank you for selecting [facility] for your healthcare needs.     Our records indicate you are due for the following services:     Immunizations: Hep B #2    To schedule an appointment or if you have further questions, please contact your primary clinic:   [facility]       [phone]   [facility]       [phone]              [facility]     [phone]    Powered by [employer]    Sincerely,    Delta Clifton MD

## 2022-02-15 NOTE — PROGRESS NOTES
Chief Complaint    pressure in ear. can feel somthing moving when truning head  History of Present Illness      Chief complaint as above reviewed and confirmed with patient.  Pt presents to the clinic with concerns re: L ear symptoms.  she was in last week for same.  Treated for eustachian  type dysfunction sx.  She was given atrovent nasal spray and has had no improvement.  no fevers.  feels and hears a sensation in L ear when leaning over or tilting head. echo sensation, crackling, fullness in the L ear.  hearing unchanged.  no pain.  no significiant uri but has some pnd, hx of mild allergies this time of year but has not been a sigificant problem.  not taking Flonase or Claritin.  Review of Systems      Review of systems is negative with the exception of those noted in HPI          Physical Exam   Vitals & Measurements    T: 98.7   F (Tympanic)  HR: 76(Peripheral)  BP: 112/70                Vitals as above per nursing documentation           Constitutional : nad appears well          Ears: ears patent B, TMS intact, noninjected, small piece of cerumen in the L ear removed with ear lavage.             Nose: nasal mucosa is non-edematous. no discharge           Throat: pharynx is nonerythematous, no tonsillar hypertrophy, no exudate           Neck: neck supple, no adenopathy, no thyromegaly, no rigidity                         Assessment/Plan       1. Eustachian tube dysfunction (H69.80)         discussed with pt that her sx are consistent with congestion in the eustachian tube  recommend Sudafed nasal decongestant, Flonase daily and Claritin if any allergy sx.  it may take several weeks to resolve. try equalizing pressures. fu prn.         Pt is transitioning care to [facility] and requests recommendation for new PCP.  She has history of Crohn's disease, well controlled on Remicade and Azathioprine.  In need of colonoscopy in the next year.  Recommended Dr. Barrientos or Dr. Caraballo for pcp.  She will schedule appt  to establish care at her convenience.  Patient Information     Name:JENNIFER CAST      Address:      [address] [place] [id number]     Sex:Female     YOB: 1975     [phone]     Emergency Contact:CHELO CAST     MRN:[id number]     FIN:[id number]     Location:[facility]     Date of Service:09/15/2018      Primary Care Physician:       NONE ,       Attending Physician:       Stephanie WANG, Amparo FINLEY, [phone]  Problem List/Past Medical History    Ongoing     Crohns disease    Historical     No qualifying data  Medications     Multiple Vitamins oral capsule: 1 cap(s), PO, Daily, 90 cap(s), 0 Refill(s).     Probiotic Formula (Bacillus Coagulans) oral capsule: 1 cap(s), po, daily, 0 Refill(s).     azaTHIOprine: daily, 0 Refill(s).     calcium (as calcium citrate) 250 mg oral tablet: 250 mg, 1 tab(s), po, bid, 0 Refill(s).     Remicade 100 mg intravenous injection: iv, 0 Refill(s).     Atrovent 42 mcg/inh nasal spray: 2 spray(s), nasal, qid, PRN: for nasal congestion, 1 EA, 2 Refill(s).          Allergies    Apples (throat swelling, eye swelling)    Hercules (throat swelling, eye swelling)    Peaches (throat swelling, eye swelling)  Social History    Smoking Status - 09/15/2018     Never smoker

## 2022-04-18 ENCOUNTER — TRANSFERRED RECORDS (OUTPATIENT)
Dept: MULTI SPECIALTY CLINIC | Facility: CLINIC | Age: 47
End: 2022-04-18

## 2022-04-18 LAB
HPV ABSTRACT: NORMAL
PAP-ABSTRACT: NORMAL

## 2022-05-07 ENCOUNTER — OFFICE VISIT (OUTPATIENT)
Dept: URGENT CARE | Facility: URGENT CARE | Age: 47
End: 2022-05-07
Payer: COMMERCIAL

## 2022-05-07 VITALS — SYSTOLIC BLOOD PRESSURE: 104 MMHG | DIASTOLIC BLOOD PRESSURE: 76 MMHG | TEMPERATURE: 97.8 F | HEART RATE: 64 BPM

## 2022-05-07 DIAGNOSIS — N76.0 VAGINITIS AND VULVOVAGINITIS: Primary | ICD-10-CM

## 2022-05-07 LAB
CLUE CELLS: ABNORMAL
TRICHOMONAS, WET PREP: ABNORMAL
WBC'S/HIGH POWER FIELD, WET PREP: ABNORMAL
YEAST, WET PREP: PRESENT

## 2022-05-07 PROCEDURE — 87210 SMEAR WET MOUNT SALINE/INK: CPT

## 2022-05-07 PROCEDURE — 99213 OFFICE O/P EST LOW 20 MIN: CPT

## 2022-05-07 RX ORDER — TRAZODONE HYDROCHLORIDE 50 MG/1
TABLET, FILM COATED ORAL
COMMUNITY
Start: 2021-03-16 | End: 2024-08-19

## 2022-05-07 RX ORDER — ADALIMUMAB 40MG/0.4ML
KIT SUBCUTANEOUS
COMMUNITY
Start: 2021-04-05

## 2022-05-07 RX ORDER — TRIAMCINOLONE ACETONIDE 1 MG/G
CREAM TOPICAL
COMMUNITY
Start: 2021-10-22

## 2022-05-07 RX ORDER — MUPIROCIN 20 MG/G
OINTMENT TOPICAL
COMMUNITY
Start: 2020-11-24 | End: 2023-07-26

## 2022-05-07 RX ORDER — FLUCONAZOLE 150 MG/1
150 TABLET ORAL ONCE
Qty: 1 TABLET | Refills: 1 | Status: SHIPPED | OUTPATIENT
Start: 2022-05-07 | End: 2022-05-07

## 2022-05-07 RX ORDER — BETAMETHASONE DIPROPIONATE 0.05 %
OINTMENT (GRAM) TOPICAL
COMMUNITY
Start: 2022-02-10

## 2022-05-07 RX ORDER — VITAMIN B COMPLEX
1 CAPSULE ORAL DAILY
COMMUNITY
Start: 2020-09-15 | End: 2023-07-26

## 2022-05-07 NOTE — PROGRESS NOTES
Assessment & Plan     Vaginitis and vulvovaginitis  Prep is positive for yeast  Treat with fluconazole 100 mg x 1 refill x1 as needed  Follow-up if symptoms do not improve  - Wet prep - Clinic Collect  - fluconazole (DIFLUCAN) 150 MG tablet; Take 1 tablet (150 mg) by mouth once for 1 dose                 No follow-ups on file.    [facility] Emanuel is a 47 year old who presents for the following health issues     HPI     Vaginal itching, minimal discharge. Hx of vulvovaginitis and treated with Flagyl BID x 7 days from August 2019 and feels similar sx. That resolved sx. Testing was negative for yeast, BV or trich. Has Crohns.     Review of Systems   Constitutional, HEENT, cardiovascular, pulmonary, gi and gu systems are negative, except as otherwise noted.      Objective    /76 (BP Location: Right arm, Patient Position: Sitting, Cuff Size: Adult Regular)   Pulse 64   Temp 97.8  F (36.6  C) (Tympanic)   There is no height or weight on file to calculate BMI.  Physical Exam   Alert, oriented, no acute distress  Normal heart rate  Nonlabored breathing  General exam reveals slightly swollen internal labia, moderate white discharge present, slight tenderness  Wet prep positive for yeast    Results for orders placed or performed in visit on 05/07/22 (from the past 24 hour(s))   Wet prep - Clinic Collect    Specimen: Vagina; Swab   Result Value Ref Range    Trichomonas Absent Absent    Yeast Present (A) Absent    Clue Cells Absent Absent    WBCs/high power field 3+ (A) None

## 2022-08-13 ENCOUNTER — HEALTH MAINTENANCE LETTER (OUTPATIENT)
Age: 47
End: 2022-08-13

## 2022-09-14 ENCOUNTER — MYC MEDICAL ADVICE (OUTPATIENT)
Dept: FAMILY MEDICINE | Facility: CLINIC | Age: 47
End: 2022-09-14

## 2022-10-30 ENCOUNTER — HEALTH MAINTENANCE LETTER (OUTPATIENT)
Age: 47
End: 2022-10-30

## 2023-03-30 ENCOUNTER — OFFICE VISIT (OUTPATIENT)
Dept: FAMILY MEDICINE | Facility: CLINIC | Age: 48
End: 2023-03-30
Payer: COMMERCIAL

## 2023-03-30 VITALS
HEIGHT: 63 IN | HEART RATE: 56 BPM | SYSTOLIC BLOOD PRESSURE: 112 MMHG | RESPIRATION RATE: 16 BRPM | DIASTOLIC BLOOD PRESSURE: 66 MMHG | BODY MASS INDEX: 26.93 KG/M2 | WEIGHT: 152 LBS | TEMPERATURE: 98.5 F | OXYGEN SATURATION: 96 %

## 2023-03-30 DIAGNOSIS — B37.31 CANDIDAL VULVOVAGINITIS: Primary | ICD-10-CM

## 2023-03-30 PROCEDURE — 99213 OFFICE O/P EST LOW 20 MIN: CPT

## 2023-03-30 RX ORDER — ESCITALOPRAM OXALATE 20 MG/1
1 TABLET ORAL EVERY MORNING
COMMUNITY
Start: 2022-10-20

## 2023-03-30 RX ORDER — FLUCONAZOLE 150 MG/1
150 TABLET ORAL DAILY
Qty: 2 TABLET | Refills: 0 | Status: SHIPPED | OUTPATIENT
Start: 2023-03-30 | End: 2023-04-01

## 2023-03-30 ASSESSMENT — ENCOUNTER SYMPTOMS
PALPITATIONS: 0
FEVER: 0
CHILLS: 0
ABDOMINAL PAIN: 0
SHORTNESS OF BREATH: 0
DYSURIA: 0
DIARRHEA: 0
NAUSEA: 0
DIZZINESS: 0

## 2023-03-30 ASSESSMENT — PAIN SCALES - GENERAL: PAINLEVEL: NO PAIN (0)

## 2023-03-30 NOTE — PROGRESS NOTES
"  Assessment & Plan     Candidal vulvovaginitis  Vaginal itching for 1.5 weeks.  Patient suspects yeast infection.  Has had small amount of bleeding.  Does not get normal menstruation since insertion of Mirena a few months ago, discussed the possibility of bleeding in the context of irritation or breakthrough bleeding with Mirena.  Also discussed possible cervical irritation with yeast infection as cause of spotting.  Patient verbalizes understanding.  Vaginal exam not completed today.  Patient also advised of boric acid vaginal suppositories for vaginal pH maintenance.  - fluconazole (DIFLUCAN) 150 MG tablet; Take 1 tablet (150 mg) by mouth daily for 2 doses  Follow-up if symptoms fail to improve or worsen.             BMI:   Estimated body mass index is 27.14 kg/m  as calculated from the following:    Height as of this encounter: 1.594 m (5' 2.75\").    Weight as of this encounter: 68.9 kg (152 lb).           CHARLINE Alexandre CNP [facility]   Jenae is a 48 year old, presenting for the following health issues:  Vaginal Itching (X 1.5 weeks. Blood present while using toilet paper this morning. Not sure of the source. Abdominal discomfort. Mirena present )    Additional Questions 3/30/2023   Roomed by SRud   Accompanied by n/a     Ortiz is a 48-year-old female ambulatory to clinic accompanied by self.  Reports she had sexual intercourse 1.5 weeks ago and felt dryness. Tried an ointment that she uses for dryness. Itchiness has decreased, but today noticed she had some blood when she wiped. Mirena inserted a few months ago, no menstruation since then. No abnormal odor. No abnormal discharge. Has had yeast infections in the past.  No concerns for STIs today, sexually active with  in monogamous relationship.    History of Present Illness       Reason for visit:  Vaginal itching and bleeding  Symptom onset:  1-2 weeks ago  Symptoms include:  Itching and " "bleeding  Symptom intensity:  Mild  Symptom progression:  Staying the same  Had these symptoms before:  Yes  Has tried/received treatment for these symptoms:  Yes  Previous treatment was successful:  Yes  Prior treatment description:  Ointment and medication  What makes it worse:  No  What makes it better:  No    She eats 4 or more servings of fruits and vegetables daily.She consumes 0 sweetened beverage(s) daily.She exercises with enough effort to increase her heart rate 30 to 60 minutes per day.  She exercises with enough effort to increase her heart rate 5 days per week. She is missing 1 dose(s) of medications per week.           Review of Systems   Constitutional: Negative for chills and fever.   Respiratory: Negative for shortness of breath.    Cardiovascular: Negative for chest pain and palpitations.   Gastrointestinal: Negative for abdominal pain, diarrhea and nausea.   Genitourinary: Positive for pelvic pain and vaginal bleeding. Negative for dysuria, urgency and vaginal discharge.   Neurological: Negative for dizziness.            Objective    /66 (BP Location: Right arm, Patient Position: Sitting)   Pulse 56   Temp 98.5  F (36.9  C) (Tympanic)   Resp 16   Ht 1.594 m (5' 2.75\")   Wt 68.9 kg (152 lb)   LMP  (LMP Unknown)   SpO2 96%   BMI 27.14 kg/m    Body mass index is 27.14 kg/m .  Physical Exam  Constitutional:       Appearance: Normal appearance.   HENT:      Head: Normocephalic.   Eyes:      Extraocular Movements: Extraocular movements intact.      Conjunctiva/sclera: Conjunctivae normal.   Neurological:      Mental Status: She is alert and oriented to person, place, and time.                            "

## 2023-06-01 ENCOUNTER — HEALTH MAINTENANCE LETTER (OUTPATIENT)
Age: 48
End: 2023-06-01

## 2023-06-13 ENCOUNTER — OFFICE VISIT (OUTPATIENT)
Dept: FAMILY MEDICINE | Facility: CLINIC | Age: 48
End: 2023-06-13
Payer: COMMERCIAL

## 2023-06-13 VITALS
RESPIRATION RATE: 16 BRPM | BODY MASS INDEX: 26.96 KG/M2 | SYSTOLIC BLOOD PRESSURE: 114 MMHG | WEIGHT: 151 LBS | TEMPERATURE: 98.6 F | DIASTOLIC BLOOD PRESSURE: 76 MMHG | HEART RATE: 52 BPM | OXYGEN SATURATION: 98 %

## 2023-06-13 DIAGNOSIS — L08.9 LOCAL INFECTION OF SKIN AND SUBCUTANEOUS TISSUE: Primary | ICD-10-CM

## 2023-06-13 PROCEDURE — 99213 OFFICE O/P EST LOW 20 MIN: CPT | Performed by: FAMILY MEDICINE

## 2023-06-13 RX ORDER — DOXYCYCLINE 100 MG/1
100 TABLET ORAL 2 TIMES DAILY
Qty: 10 TABLET | Refills: 0 | Status: SHIPPED | OUTPATIENT
Start: 2023-06-13 | End: 2023-07-19

## 2023-06-13 NOTE — PROGRESS NOTES
"  Assessment & Plan     Local infection of skin and subcutaneous tissue  Patient with persistent infection in her lobes will cover for staph with doxycycline.  Consider Derm referral if she is not improving.  Consider more prolonged course because of her Humira.  - doxycycline monohydrate (ADOXA) 100 MG tablet; Take 1 tablet (100 mg) by mouth 2 times daily             BMI:   Estimated body mass index is 26.96 kg/m  as calculated from the following:    Height as of 3/30/23: 1.594 m (5' 2.75\").    Weight as of this encounter: 68.5 kg (151 lb).           Leonides Subarmanian MD  [facility]    Renato Emanuel is a 48 year old, presenting for the following health issues: Patient had issues with her earlobes.  She notes that she has been struggling for well over a month with sore tender earlobes.  She has not tried her earring since a month ago now.  She was put on Augmentin which made very little difference.  No discharge.  Some tenderness and redness.  piercing infection (Bilateral ear lobes x 1 month. )        6/13/2023     5:24 PM   Additional Questions   Roomed by Isa     History of Present Illness       Reason for visit:  Ear infection    She eats 2-3 servings of fruits and vegetables daily.She consumes 1 sweetened beverage(s) daily.She exercises with enough effort to increase her heart rate 30 to 60 minutes per day.  She exercises with enough effort to increase her heart rate 4 days per week.   She is taking medications regularly.               Review of Systems         Objective    /76 (BP Location: Right arm, Patient Position: Sitting, Cuff Size: Adult Regular)   Pulse 52   Temp 98.6  F (37  C) (Temporal)   Resp 16   Wt 68.5 kg (151 lb)   LMP  (LMP Unknown)   SpO2 98%   BMI 26.96 kg/m    Body mass index is 26.96 kg/m .  Physical Exam   Her earlobes reveal mild redness tenderness and slight swelling.  Some cystic formation in the left noted.                    "

## 2023-07-14 ENCOUNTER — TELEPHONE (OUTPATIENT)
Dept: FAMILY MEDICINE | Facility: CLINIC | Age: 48
End: 2023-07-14
Payer: COMMERCIAL

## 2023-07-14 NOTE — TELEPHONE ENCOUNTER
FYI - Status Update    Who is Calling: Pt     Update: Pt calling to inquire as to what the next steps would be.  She said that when she spoke with TFS, the plan was to either extend treatment with doxycycline or refer pt to a specialist.     Does caller want a call/response back: Yes     Could we send this information to you in [employer] or would you prefer to receive a phone call?:   No preference     Okay to leave a detailed message?: Yes at Home number on file [phone] (home) or Cell number on file:    Telephone Information:   Mobile [phone]

## 2023-07-19 ENCOUNTER — OFFICE VISIT (OUTPATIENT)
Dept: FAMILY MEDICINE | Facility: CLINIC | Age: 48
End: 2023-07-19
Payer: COMMERCIAL

## 2023-07-19 VITALS
HEART RATE: 54 BPM | BODY MASS INDEX: 27.29 KG/M2 | OXYGEN SATURATION: 97 % | RESPIRATION RATE: 16 BRPM | TEMPERATURE: 98.1 F | WEIGHT: 154 LBS | SYSTOLIC BLOOD PRESSURE: 116 MMHG | DIASTOLIC BLOOD PRESSURE: 70 MMHG | HEIGHT: 63 IN

## 2023-07-19 DIAGNOSIS — L08.9 LOCAL INFECTION OF SKIN AND SUBCUTANEOUS TISSUE: Primary | ICD-10-CM

## 2023-07-19 PROCEDURE — 99213 OFFICE O/P EST LOW 20 MIN: CPT | Performed by: FAMILY MEDICINE

## 2023-07-19 RX ORDER — DOXYCYCLINE 100 MG/1
100 TABLET ORAL DAILY
Qty: 30 TABLET | Refills: 1 | Status: SHIPPED | OUTPATIENT
Start: 2023-07-19 | End: 2024-08-19

## 2023-07-19 RX ORDER — MUPIROCIN 20 MG/G
OINTMENT TOPICAL 3 TIMES DAILY
Qty: 30 G | Refills: 3 | Status: SHIPPED | OUTPATIENT
Start: 2023-07-19

## 2023-07-19 NOTE — PROGRESS NOTES
"  Assessment & Plan     Local infection of skin and subcutaneous tissue  With infected earlobes the left side now is completely clear right-sided seems to be coming back again.  We will give her doxycycline 100 mg daily for the next month Bactroban as well  - doxycycline monohydrate (ADOXA) 100 MG tablet; Take 1 tablet (100 mg) by mouth daily  - mupirocin (BACTROBAN) 2 % external ointment; Apply topically 3 times daily             BMI:   Estimated body mass index is 27.5 kg/m  as calculated from the following:    Height as of this encounter: 1.594 m (5' 2.75\").    Weight as of this encounter: 69.9 kg (154 lb).           Leonides Subramanian MD  [facility]   Jenae is a 48 year old, presenting for the following health issues: Patient notes that her left ear completely improved has not come back her right ear was improving but got worse after doxycycline.  No other complaints  RECHECK (Ear lobes. Did not resolve. )        7/19/2023     4:44 PM   Additional Questions   Roomed by Isa     History of Present Illness       Reason for visit:  Ear lobe infection    She eats 2-3 servings of fruits and vegetables daily.She consumes 1 sweetened beverage(s) daily.She exercises with enough effort to increase her heart rate 30 to 60 minutes per day.  She exercises with enough effort to increase her heart rate 4 days per week.   She is taking medications regularly.               Review of Systems         Objective    /70 (BP Location: Right arm, Patient Position: Sitting, Cuff Size: Adult Regular)   Pulse 54   Temp 98.1  F (36.7  C) (Temporal)   Resp 16   Ht 1.594 m (5' 2.75\")   Wt 69.9 kg (154 lb)   LMP  (LMP Unknown)   SpO2 97%   BMI 27.50 kg/m    Body mass index is 27.5 kg/m .  Physical Exam   Earlobes today reveals left side to be normal right side reveals some mild swelling and redness and scaling over primarily the posterior lobe.                    "

## 2023-07-26 ENCOUNTER — OFFICE VISIT (OUTPATIENT)
Dept: FAMILY MEDICINE | Facility: CLINIC | Age: 48
End: 2023-07-26
Payer: COMMERCIAL

## 2023-07-26 VITALS
HEIGHT: 63 IN | OXYGEN SATURATION: 96 % | WEIGHT: 154.4 LBS | DIASTOLIC BLOOD PRESSURE: 80 MMHG | SYSTOLIC BLOOD PRESSURE: 108 MMHG | BODY MASS INDEX: 27.36 KG/M2 | HEART RATE: 56 BPM | RESPIRATION RATE: 16 BRPM | TEMPERATURE: 98.8 F

## 2023-07-26 DIAGNOSIS — J04.0 LARYNGITIS: ICD-10-CM

## 2023-07-26 DIAGNOSIS — R05.1 ACUTE COUGH: Primary | ICD-10-CM

## 2023-07-26 PROCEDURE — 99213 OFFICE O/P EST LOW 20 MIN: CPT | Performed by: PHYSICIAN ASSISTANT

## 2023-07-26 RX ORDER — CHOLECALCIFEROL (VITAMIN D3) 50 MCG
1 TABLET ORAL DAILY
COMMUNITY

## 2023-07-26 ASSESSMENT — ENCOUNTER SYMPTOMS: COUGH: 1

## 2023-07-26 NOTE — PATIENT INSTRUCTIONS
Flonase 2 sprays each side twice daily  Delsym for cough.    Hydration : humidify air in the room you sleep, turn off fan.    Drink plenty of fluids.   Voice rest.

## 2023-07-26 NOTE — PROGRESS NOTES
"Assessment & Plan     Acute cough  Lungs clear, no fevers. She is on doxycycline for  a skin infection. Discussed this would typically cover CAP, bronchitis, sinus infection etc.   Discussed likely viral or related to allergies, pnd, etc.  Continue conservative measures.    No indication for xray today given normal sats, no sob, no fevers.    Laryngitis    Pt seen for constillation of sx with persistent laryngitis, uri sx.  Laryngitis has not been improving as expected.    Pt has tried conservative measures without improvement.  Her URI sx are mild and improving.  She has not been overusing or stressing voice.  I reassured pt this is likely viral.  I would recommend continued conservative measures such as humidification, nasal spray, losenges, voice rest.  Referral to ENT if sx not resolved as expected in 2-3 weeks.      - Adult ENT  Referral         Amparo Brown PA-C  [facility]    Renato Emanuel is a 48 year old female who presents to clinic today for the following health issues:  Chief Complaint   Patient presents with    Cough     Productive cough and loss of voice x 10 days. Feeling well otherwise.     Pt presents to the clinic with 1.5 week hx of cough and laryngitis.  No fevers, no sob, no chest pain.  No facial pain or tooth pain.    No ST, no rhinorrhea, congestion.   Allergies have been fine.    No voice stress.        Review of Systems   Respiratory:  Positive for cough.      Constitutional, HEENT, cardiovascular, pulmonary, gi and gu systems are negative, except as otherwise noted.      Objective    /80 (BP Location: Right arm, Patient Position: Sitting, Cuff Size: Adult Regular)   Pulse 56   Temp 98.8  F (37.1  C) (Oral)   Resp 16   Ht 1.594 m (5' 2.75\")   Wt 70 kg (154 lb 6.4 oz)   LMP  (LMP Unknown)   SpO2 96%   BMI 27.57 kg/m    Physical Exam   Pt is in no acute distress and appears well  Ears patent B:  TM s intact, non-injected. All " land marks easily visibile    Nasal mucosa is non-edematous, no discharge.    Pharynx: non erythematous, tonsils non hypertrophied, No exudate   Neck supple: no adenopathy  Lungs: CTA  Heart: RRR, no murmur, no thrills or heaves   Ext: no edema  Skin: no rashes

## 2024-01-24 ENCOUNTER — IMMUNIZATION (OUTPATIENT)
Dept: FAMILY MEDICINE | Facility: CLINIC | Age: 49
End: 2024-01-24
Payer: COMMERCIAL

## 2024-01-24 DIAGNOSIS — Z23 NEED FOR VACCINATION: Primary | ICD-10-CM

## 2024-01-24 PROCEDURE — 91320 SARSCV2 VAC 30MCG TRS-SUC IM: CPT

## 2024-01-24 PROCEDURE — 99207 PR NO CHARGE NURSE ONLY: CPT

## 2024-01-24 PROCEDURE — 90480 ADMN SARSCOV2 VAC 1/ONLY CMP: CPT

## 2024-01-24 NOTE — PROGRESS NOTES
Prior to immunization administration, verified patients identity using patient s name and date of birth. Please see Immunization Activity for additional information.     Screening Questionnaire for Adult Immunization    Are you sick today?   No   Do you have allergies to medications, food, a vaccine component or latex?   No   Have you ever had a serious reaction after receiving a vaccination?   No   Do you have a long-term health problem with heart, lung, kidney, or metabolic disease (e.g., diabetes), asthma, a blood disorder, no spleen, complement component deficiency, a cochlear implant, or a spinal fluid leak?  Are you on long-term aspirin therapy?   No   Do you have cancer, leukemia, HIV/AIDS, or any other immune system problem?   No   Do you have a parent, brother, or sister with an immune system problem?   No   In the past 3 months, have you taken medications that affect  your immune system, such as prednisone, other steroids, or anticancer drugs; drugs for the treatment of rheumatoid arthritis, Crohn s disease, or psoriasis; or have you had radiation treatments?   No   Have you had a seizure, or a brain or other nervous system problem?   No   During the past year, have you received a transfusion of blood or blood    products, or been given immune (gamma) globulin or antiviral drug?   No   For women: Are you pregnant or is there a chance you could become       pregnant during the next month?   No   Have you received any vaccinations in the past 4 weeks?   No     Immunization questionnaire answers were all negative.    I have reviewed the following standing orders:   This patient is due and qualifies for the Covid-19 vaccine.     Click here for COVID-19 Standing Order    I have reviewed the vaccines inclusion and exclusion criteria; No concerns regarding eligibility.     Patient instructed to remain in clinic for 15 minutes afterwards, and to report any adverse reactions.     Screening performed by Nithya VARGAS  TORSTEN Diop on 1/24/2024 at 4:28 PM.

## 2024-06-09 ENCOUNTER — HEALTH MAINTENANCE LETTER (OUTPATIENT)
Age: 49
End: 2024-06-09

## 2024-08-19 ENCOUNTER — OFFICE VISIT (OUTPATIENT)
Dept: FAMILY MEDICINE | Facility: CLINIC | Age: 49
End: 2024-08-19
Payer: COMMERCIAL

## 2024-08-19 VITALS
WEIGHT: 163.6 LBS | BODY MASS INDEX: 28.99 KG/M2 | HEART RATE: 58 BPM | DIASTOLIC BLOOD PRESSURE: 72 MMHG | RESPIRATION RATE: 16 BRPM | SYSTOLIC BLOOD PRESSURE: 118 MMHG | TEMPERATURE: 97.7 F | HEIGHT: 63 IN | OXYGEN SATURATION: 97 %

## 2024-08-19 DIAGNOSIS — R10.31 ABDOMINAL PAIN, RIGHT LOWER QUADRANT: ICD-10-CM

## 2024-08-19 DIAGNOSIS — L23.7 CONTACT DERMATITIS DUE TO POISON IVY: Primary | ICD-10-CM

## 2024-08-19 DIAGNOSIS — R19.00 ABDOMINAL WALL BULGE: ICD-10-CM

## 2024-08-19 DIAGNOSIS — L08.9 LOCAL INFECTION OF SKIN AND SUBCUTANEOUS TISSUE: ICD-10-CM

## 2024-08-19 PROCEDURE — 99214 OFFICE O/P EST MOD 30 MIN: CPT | Performed by: NURSE PRACTITIONER

## 2024-08-19 PROCEDURE — G2211 COMPLEX E/M VISIT ADD ON: HCPCS | Performed by: NURSE PRACTITIONER

## 2024-08-19 RX ORDER — PREDNISONE 10 MG/1
TABLET ORAL
Qty: 30 TABLET | Refills: 0 | Status: SHIPPED | OUTPATIENT
Start: 2024-08-19 | End: 2024-08-31

## 2024-08-19 RX ORDER — CEPHALEXIN 500 MG/1
500 CAPSULE ORAL 2 TIMES DAILY
Qty: 14 CAPSULE | Refills: 0 | Status: SHIPPED | OUTPATIENT
Start: 2024-08-19 | End: 2024-08-26

## 2024-08-19 RX ORDER — TRIAMCINOLONE ACETONIDE 1 MG/G
CREAM TOPICAL 2 TIMES DAILY
Qty: 30 G | Refills: 0 | Status: SHIPPED | OUTPATIENT
Start: 2024-08-19

## 2024-08-19 NOTE — PROGRESS NOTES
"  Assessment & Plan     Contact dermatitis due to poison ivy  Presentation and history suggests poison ivy rash with some linear lesions and weeping with intense itching.  Recommend prednisone oral topical steroid as below.  Will cover for possible secondary infection/developing cellulitis with cephalexin as below.  Follow up if not improving in 48 to 72 hours or for new or concerning symptoms.   - predniSONE (DELTASONE) 10 MG tablet; Take 4 tablets (40 mg) by mouth daily for 3 days, THEN 3 tablets (30 mg) daily for 3 days, THEN 2 tablets (20 mg) daily for 3 days, THEN 1 tablet (10 mg) daily for 3 days.  - triamcinolone (KENALOG) 0.1 % external cream; Apply topically 2 times daily X 2 weeks then as needed    Local infection of skin and subcutaneous tissue  Has allergy to topical antibiotics.  Recommend oral abx as below.   cephALEXin (KEFLEX) 500 MG capsule; Take 1 capsule (500 mg) by mouth 2 times daily for 7 days    Abdominal pain, right lower quadrant  Pain and bulge of RLQ, will evaluate CT scan and order placed.   No bowel changes.   - CT Abdomen Pelvis w/o & w Contrast; Future    Abdominal wall bulge  - CT Abdomen Pelvis w/o & w Contrast; Future          BMI  Estimated body mass index is 29.21 kg/m  as calculated from the following:    Height as of this encounter: 1.594 m (5' 2.75\").    Weight as of this encounter: 74.2 kg (163 lb 9.6 oz).             Subjective   Jenae is a 49 year old, presenting for the following health issues:  Insect Bites (Pt has a bite on leg that is oozing x 2-3 days) and Abdominal Pain (Stomach pain x 1 week, pt states thinks it could be a hernia)        8/19/2024     2:52 PM   Additional Questions   Roomed by TORSTEN Baez   Accompanied by Daughter     Right lower leg X 3 days, itching, [place] and dog likes to swim in the river.    Scratching, seems to be spreading  Started with blisters    Working out/tough ab work up  Was sore for a few days, sneezed in middle of " "night and felt stabbing and burning pain  Has been sore since despite resting, no ab work out or gym since    Has been lifting heavy things over the weekends, holding against her stomach can be painful  RLQ pain exacerbated by coughg and sneezing   No fever or chills, no bowel or bladder changes.     History of Present Illness       Reason for visit:  Stomach muscle pain and possible poisin ivy  Symptom onset:  3-7 days ago    She eats 2-3 servings of fruits and vegetables daily.She consumes 2 sweetened beverage(s) daily.She exercises with enough effort to increase her heart rate 30 to 60 minutes per day.  She exercises with enough effort to increase her heart rate 3 or less days per week.   She is taking medications regularly.                 Review of Systems  Constitutional, HEENT, cardiovascular, pulmonary, gi and gu, skin systems are negative, except as otherwise noted.      Objective    /72 (BP Location: Right arm, Patient Position: Sitting, Cuff Size: Adult Regular)   Pulse 58   Temp 97.7  F (36.5  C) (Tympanic)   Resp 16   Ht 1.594 m (5' 2.75\")   Wt 74.2 kg (163 lb 9.6 oz)   SpO2 97%   BMI 29.21 kg/m    Body mass index is 29.21 kg/m .  Physical Exam  Constitutional:       Appearance: Normal appearance.   HENT:      Head: Normocephalic and atraumatic.   Eyes:      Pupils: Pupils are equal, round, and reactive to light.   Cardiovascular:      Rate and Rhythm: Normal rate and regular rhythm.   Pulmonary:      Effort: Pulmonary effort is normal.      Breath sounds: Normal breath sounds.   Abdominal:      General: Abdomen is flat. Bowel sounds are normal.      Palpations: Abdomen is soft.      Tenderness: There is abdominal tenderness. There is no right CVA tenderness, left CVA tenderness, guarding or rebound.      Hernia: A hernia is present.       Musculoskeletal:         General: Normal range of motion.      Right lower leg: No edema.      Left lower leg: No edema.   Skin:     General: Skin is " warm.      Capillary Refill: Capillary refill takes less than 2 seconds.      Findings: Rash present. Rash is vesicular. Rash is not pustular.             Comments: Red and weeping with some linear lesion   Neurological:      Mental Status: She is alert and oriented to person, place, and time.                              Signed Electronically by: CHARLINE Ramirez CNP

## 2024-08-29 ENCOUNTER — TELEPHONE (OUTPATIENT)
Dept: FAMILY MEDICINE | Facility: CLINIC | Age: 49
End: 2024-08-29
Payer: COMMERCIAL

## 2024-08-29 DIAGNOSIS — R10.31 ABDOMINAL PAIN, RIGHT LOWER QUADRANT: ICD-10-CM

## 2024-08-29 DIAGNOSIS — R93.5 ABNORMAL CT OF THE ABDOMEN: Primary | ICD-10-CM

## 2024-08-29 NOTE — TELEPHONE ENCOUNTER
8-29-24    Test Results    Who ordered the test:  meme    Type of test: CT    Date of test:  8-23    Where was the test performed:  [facility]    What are your questions/concerns?:  pt would like CT results from [facility] from 8-29    Could we [facility] this [facility] to you [facility] or would you prefer to receive a phone call?:   Patient would prefer a phone call   Okay to leave a detailed message?: Yes at Cell number on file:    Telephone Information:   Mobile [phone]

## 2024-08-30 NOTE — TELEPHONE ENCOUNTER
Call with pt and given results message from provider. Pt is requesting to have MRI done at [facility] and referred to [facility] Dr. Wilkerson.  Routing to [facility] to place referrals.

## 2024-08-30 NOTE — TELEPHONE ENCOUNTER
I reviewed CT scan.  Area of pain shows possible endometrioma versus rectal sheath hematoma.  Radiologist suggest that MRI of the abdominal wall could be considered for further evaluation.  I can place referral for MRI but also recommend consult with gynecology.  Does she have a preference of where she would like to be referred for the MRI and Gynecology appointment?

## 2024-08-30 NOTE — TELEPHONE ENCOUNTER
I placed referral to gynecology and order for MRI.  Can we please fax order to [facility]?

## 2025-05-12 ENCOUNTER — OFFICE VISIT (OUTPATIENT)
Dept: FAMILY MEDICINE | Facility: CLINIC | Age: 50
End: 2025-05-12
Payer: COMMERCIAL

## 2025-05-12 VITALS
HEART RATE: 67 BPM | TEMPERATURE: 98.3 F | OXYGEN SATURATION: 99 % | SYSTOLIC BLOOD PRESSURE: 126 MMHG | DIASTOLIC BLOOD PRESSURE: 88 MMHG | BODY MASS INDEX: 30.09 KG/M2 | RESPIRATION RATE: 16 BRPM | HEIGHT: 63 IN | WEIGHT: 169.8 LBS

## 2025-05-12 DIAGNOSIS — Z23 NEED FOR VACCINATION: ICD-10-CM

## 2025-05-12 DIAGNOSIS — L01.00 IMPETIGO: Primary | ICD-10-CM

## 2025-05-12 PROCEDURE — 90471 IMMUNIZATION ADMIN: CPT | Performed by: FAMILY MEDICINE

## 2025-05-12 PROCEDURE — 3079F DIAST BP 80-89 MM HG: CPT | Performed by: FAMILY MEDICINE

## 2025-05-12 PROCEDURE — 99213 OFFICE O/P EST LOW 20 MIN: CPT | Mod: 25 | Performed by: FAMILY MEDICINE

## 2025-05-12 PROCEDURE — G2211 COMPLEX E/M VISIT ADD ON: HCPCS | Performed by: FAMILY MEDICINE

## 2025-05-12 PROCEDURE — 3074F SYST BP LT 130 MM HG: CPT | Performed by: FAMILY MEDICINE

## 2025-05-12 PROCEDURE — 90715 TDAP VACCINE 7 YRS/> IM: CPT | Performed by: FAMILY MEDICINE

## 2025-05-12 RX ORDER — DOXYCYCLINE HYCLATE 100 MG
100 TABLET ORAL 2 TIMES DAILY
Qty: 20 TABLET | Refills: 0 | Status: SHIPPED | OUTPATIENT
Start: 2025-05-12 | End: 2025-05-22

## 2025-05-12 RX ORDER — MULTIVITAMIN
1 TABLET ORAL DAILY
COMMUNITY

## 2025-05-12 RX ORDER — MUPIROCIN 20 MG/G
OINTMENT TOPICAL 3 TIMES DAILY
Qty: 30 G | Refills: 0 | Status: SHIPPED | OUTPATIENT
Start: 2025-05-12

## 2025-05-12 NOTE — PATIENT INSTRUCTIONS
Thank you for visiting us today. Here is a summary of my recommendations based on what we discussed:    This is most likely impetigo which is kind of staph infection of the earlobe, although we cannot 100% rule out that this is contact dermatitis, the fact that you have had purulent type discharge in the past and you are using Humira points more to a bacterial infection.  Doxycycline does not get well absorbed with calcium so you will have to not take this medication during the time you take the antibiotic.  In addition be wary that you can have a photosensitivity reaction while taking this medication and having direct exposure to the sun so try to minimize this as much as possible.  I also sent mupirocin, if the infection gets completely resolved and in the future you get this again try to apply the mupirocin right away.    Please let us know if you have any questions via [facility] or you can call us too.      Noam Thayer MD)

## 2025-05-12 NOTE — PROGRESS NOTES
"  Assessment & Plan   Problem List Items Addressed This Visit       Impetigo - Primary    Relevant Medications    doxycycline hyclate (VIBRA-TABS) 100 MG tablet    mupirocin (BACTROBAN) 2 % external ointment    Need for vaccination      I prescribed doxycycline and mupirocin.  I recommend monitoring  resolution of symptoms.  If persisting or worsening to call us back or follow up for re-examination.  Updated her tetanus shot today as well.       BMI  Estimated body mass index is 30.32 kg/m  as calculated from the following:    Height as of this encounter: 1.594 m (5' 2.75\").    Weight as of this encounter: 77 kg (169 lb 12.8 oz).           Subjective   Jenae is a 50 year old, presenting for the following health issues:  Ear Problem (Right ear infection on outside of ear lobe x 3 weeks )  The patient is here complaining of possible infection affecting the earlobe piercing on his right side, this happens from time to time.  There are several visits documented when she has been on antibiotics.  She says this time it only took 2 days after wearing earrings for this to happen.      5/12/2025     4:09 PM   Additional Questions   Roomed by TORSTEN Godwin     Ear Problem                        Objective    /88   Pulse 67   Temp 98.3  F (36.8  C) (Oral)   Resp 16   Ht 1.594 m (5' 2.75\")   Wt 77 kg (169 lb 12.8 oz)   SpO2 99%   BMI 30.32 kg/m    Body mass index is 30.32 kg/m .  Physical Exam   On exam the patient appears in no acute distress, vital signs were reviewed and within normal limits.  HEENT: Pupils equally reactive to light, conjunctivae and sclerae within normal limits.  Oropharynx is clear.  Ear examination: The right ear piercing shows scaliness and crusting suggestive of impetigo, no signs of cellulitis.  The left ear appears to be unaffected.            Signed Electronically by: Saul Thayer MD    "

## 2025-05-14 PROBLEM — Z23 NEED FOR VACCINATION: Status: ACTIVE | Noted: 2025-05-14

## 2025-05-14 PROBLEM — L01.00 IMPETIGO: Status: ACTIVE | Noted: 2025-05-14

## 2025-05-21 ENCOUNTER — TELEPHONE (OUTPATIENT)
Dept: FAMILY MEDICINE | Facility: CLINIC | Age: 50
End: 2025-05-21
Payer: COMMERCIAL

## 2025-05-21 NOTE — TELEPHONE ENCOUNTER
Patient Quality Outreach    Patient is due for the following:   Physical Preventive Adult Physical/pap    Action(s) Taken:   Schedule a Adult Preventative    Type of outreach:    Patient was scheduled during office visit     Questions for provider review:    None         Katt Curtis MA  Chart routed to None.

## 2025-06-12 ENCOUNTER — OFFICE VISIT (OUTPATIENT)
Dept: FAMILY MEDICINE | Facility: CLINIC | Age: 50
End: 2025-06-12
Payer: COMMERCIAL

## 2025-06-12 VITALS
HEIGHT: 63 IN | WEIGHT: 171 LBS | DIASTOLIC BLOOD PRESSURE: 62 MMHG | HEART RATE: 84 BPM | SYSTOLIC BLOOD PRESSURE: 106 MMHG | BODY MASS INDEX: 30.3 KG/M2 | RESPIRATION RATE: 16 BRPM | TEMPERATURE: 97.4 F | OXYGEN SATURATION: 97 %

## 2025-06-12 DIAGNOSIS — Z00.00 ANNUAL PHYSICAL EXAM: Primary | ICD-10-CM

## 2025-06-12 DIAGNOSIS — Z11.59 NEED FOR HEPATITIS C SCREENING TEST: ICD-10-CM

## 2025-06-12 DIAGNOSIS — K50.813 CROHN'S DISEASE OF BOTH SMALL AND LARGE INTESTINE WITH FISTULA (H): ICD-10-CM

## 2025-06-12 DIAGNOSIS — Z13.6 SCREENING FOR CARDIOVASCULAR CONDITION: ICD-10-CM

## 2025-06-12 DIAGNOSIS — F41.9 ANXIETY: ICD-10-CM

## 2025-06-12 DIAGNOSIS — Z11.4 SCREENING FOR HIV (HUMAN IMMUNODEFICIENCY VIRUS): ICD-10-CM

## 2025-06-12 DIAGNOSIS — Z13.220 SCREENING CHOLESTEROL LEVEL: ICD-10-CM

## 2025-06-12 DIAGNOSIS — L30.9 ECZEMA, UNSPECIFIED TYPE: ICD-10-CM

## 2025-06-12 DIAGNOSIS — Z13.1 SCREENING FOR DIABETES MELLITUS: ICD-10-CM

## 2025-06-12 RX ORDER — TRIAMCINOLONE ACETONIDE 1 MG/G
CREAM TOPICAL 3 TIMES DAILY
Qty: 45 G | Refills: 0 | Status: SHIPPED | OUTPATIENT
Start: 2025-06-12

## 2025-06-12 RX ORDER — CETIRIZINE HYDROCHLORIDE 10 MG/1
10 TABLET ORAL DAILY PRN
COMMUNITY

## 2025-06-12 SDOH — HEALTH STABILITY: PHYSICAL HEALTH: ON AVERAGE, HOW MANY DAYS PER WEEK DO YOU ENGAGE IN MODERATE TO STRENUOUS EXERCISE (LIKE A BRISK WALK)?: 1 DAY

## 2025-06-12 ASSESSMENT — SOCIAL DETERMINANTS OF HEALTH (SDOH): HOW OFTEN DO YOU GET TOGETHER WITH FRIENDS OR RELATIVES?: ONCE A WEEK

## 2025-06-12 NOTE — PROGRESS NOTES
Preventive Care Visit  [facility]  Georgiaolivia LindseyCHARLINE espana CNP, Family Medicine  Jun 12, 2025      Assessment & Plan     Annual physical exam  Exam without abnormal findings.  Patient up-to-date on mammogram and does colonoscopy every 2 to 3 years secondary to Crohn's    Anxiety  Takes escitalopram, does take 30 mg daily.  Would like to decrease and go off this medication during summer, discussed weaning of medication.  Discussed side effects that may be experienced while weaning, also discussed side effects that patient is experiencing of medication which include difficulty achieving orgasm.  Medication is otherwise well-tolerated and has been used long-term by patient.  - Basic metabolic panel  (Ca, Cl, CO2, Creat, Gluc, K, Na, BUN); Future    Eczema, unspecified type  Triamcinolone cream used as needed for eczema refilled.  Patient aware that frequent or prolonged use of topical steroid may cause skin thinning.  - triamcinolone (KENALOG) 0.1 % external cream; Apply topically 3 times daily. APPLY  TOPICALLY TO AFFECTED AREA(S) THREE TIMES DAILY IF NEEDED    Crohn's disease of both small and large intestine with fistula (H)  Follows with [facility].  No concerns today.  Patient does do colonoscopy as recommended (every 2 to 3 years).    Screening cholesterol level  Family history (father) of cardiovascular disease and hypercholesterolemia.  Patient to return to clinic tomorrow while fasting to have labs drawn  - Lipid panel reflex to direct LDL Non-fasting; Future    Screening for cardiovascular condition  The ASCVD Risk score (Stan CARRION, et al., 2019) failed to calculate for the following reasons:    Cannot find a previous HDL lab    Cannot find a previous total cholesterol lab  Will calculate 10-year ASCVD risk once lab results available    Screening for diabetes mellitus  Patient to have BMP done tomorrow fasting  - Basic metabolic panel  (Ca, Cl, CO2, Creat, Gluc, K, Na, BUN);  "Future    Screening for HIV (human immunodeficiency virus)  Amenable to one-time screening as recommended for all sexually active adults.  - HIV Antigen Antibody Combo; Future    Need for hepatitis C screening test  Amenable to one-time screening as recommended for all sexually active adults.  - Hepatitis C Screen Reflex to HCV RNA Quant and Genotype; Future            BMI  Estimated body mass index is 30.29 kg/m  as calculated from the following:    Height as of this encounter: 1.6 m (5' 3\").    Weight as of this encounter: 77.6 kg (171 lb).       Counseling  Appropriate preventive services were addressed with this patient via screening, questionnaire, or discussion as appropriate for fall prevention, nutrition, physical activity, Tobacco-use cessation, social engagement, weight loss and cognition.  Checklist reviewing preventive services available has been given to the patient.  Reviewed patient's diet, addressing concerns and/or questions.   She is at risk for lack of exercise and has been provided with information to increase physical activity for the benefit of her well-being.   She is at risk for psychosocial distress and has been provided with information to reduce risk.           Renato Emanuel is a 50 year old, presenting for the following:  Physical        6/12/2025     8:44 AM   Additional Questions   Roomed by apolonia byrne   Accompanied by self          HPI           Advance Care Planning    Discussed advance care planning with patient; however, patient declined at this time.        6/12/2025   General Health   How would you rate your overall physical health? Good   Feel stress (tense, anxious, or unable to sleep) Very much   (!) STRESS CONCERN      6/12/2025   Nutrition   Three or more servings of calcium each day? Yes   Diet: Regular (no restrictions)   How many servings of fruit and vegetables per day? (!) 2-3   How many sweetened beverages each day? 0-1         6/12/2025   Exercise   Days per week " of moderate/strenous exercise 1 day   (!) EXERCISE CONCERN      6/12/2025   Social Factors   Frequency of gathering with friends or relatives Once a week   Worry food won't last until get money to buy more No   Food not last or not have enough money for food? No   Do you have housing? (Housing is defined as stable permanent housing and does not include staying outside in a car, in a tent, in an abandoned building, in an overnight shelter, or couch-surfing.) Yes   Are you worried about losing your housing? No   Lack of transportation? No   Unable to get utilities (heat,electricity)? No         6/12/2025   Dental   Dentist two times every year? Yes           Today's PHQ-2 Score:       5/12/2025     4:15 PM   PHQ-2 ( 1999 [employer])   Q1: Little interest or pleasure in doing things 0   Q2: Feeling down, depressed or hopeless 0   PHQ-2 Score 0         6/12/2025   Substance Use   Alcohol more than 3/day or more than 7/wk No   Do you use any other substances recreationally? (!) DECLINE     Social History     Tobacco Use    Smoking status: Never     Passive exposure: Never    Smokeless tobacco: Never   Vaping Use    Vaping status: Never Used                    6/12/2025   One time HIV Screening   Previous HIV test? I don't know         6/12/2025   STI Screening   New sexual partner(s) since last STI/HIV test? No     History of abnormal Pap smear: No - age 30- 64 PAP with HPV every 5 years recommended        4/18/2022     6:00 PM   PAP / HPV   PAP-ABSTRACT See Scanned Document      ASCVD Risk   The ASCVD Risk score (Stan CARRION, et al., 2019) failed to calculate for the following reasons:    Cannot find a previous HDL lab    Cannot find a previous total cholesterol lab        6/12/2025   Contraception/Family Planning   Questions about contraception or family planning No        Reviewed and updated as needed this visit by Provider                             Objective    Exam  /62 (BP Location: Right arm, Patient  "Position: Sitting)   Pulse 84   Temp 97.4  F (36.3  C) (Tympanic)   Resp 16   Ht 1.6 m (5' 3\")   Wt 77.6 kg (171 lb)   LMP  (LMP Unknown)   SpO2 97%   BMI 30.29 kg/m     Estimated body mass index is 30.29 kg/m  as calculated from the following:    Height as of this encounter: 1.6 m (5' 3\").    Weight as of this encounter: 77.6 kg (171 lb).    Physical Exam  GENERAL: alert and no distress  EYES: Eyes grossly normal to inspection, PERRL and conjunctivae and sclerae normal  HENT: ear canals and TM's normal, nose and mouth without ulcers or lesions  NECK: no adenopathy, no asymmetry, masses, or scars  RESP: lungs clear to auscultation - no rales, rhonchi or wheezes  CV: regular rate and rhythm, normal S1 S2, no S3 or S4, no murmur, click or rub, no peripheral edema  ABDOMEN: soft, nontender, no hepatosplenomegaly, no masses and bowel sounds normal  MS: no gross musculoskeletal defects noted, no edema  SKIN: no suspicious lesions or rashes  NEURO: Normal strength and tone, mentation intact and speech normal  PSYCH: mentation appears normal, affect normal/bright        Signed Electronically by: CHARLINE Alexandre CNP    "

## 2025-06-13 ENCOUNTER — RESULTS FOLLOW-UP (OUTPATIENT)
Dept: FAMILY MEDICINE | Facility: CLINIC | Age: 50
End: 2025-06-13

## 2025-06-23 ENCOUNTER — OFFICE VISIT (OUTPATIENT)
Dept: FAMILY MEDICINE | Facility: CLINIC | Age: 50
End: 2025-06-23
Payer: COMMERCIAL

## 2025-06-23 VITALS
BODY MASS INDEX: 30.42 KG/M2 | OXYGEN SATURATION: 99 % | RESPIRATION RATE: 16 BRPM | WEIGHT: 171.7 LBS | SYSTOLIC BLOOD PRESSURE: 116 MMHG | TEMPERATURE: 98 F | HEIGHT: 63 IN | DIASTOLIC BLOOD PRESSURE: 74 MMHG | HEART RATE: 86 BPM

## 2025-06-23 DIAGNOSIS — K50.813 CROHN'S DISEASE OF BOTH SMALL AND LARGE INTESTINE WITH FISTULA (H): ICD-10-CM

## 2025-06-23 DIAGNOSIS — Z01.818 PREOP GENERAL PHYSICAL EXAM: Primary | ICD-10-CM

## 2025-06-23 DIAGNOSIS — J30.9 ALLERGIC RHINITIS, UNSPECIFIED SEASONALITY, UNSPECIFIED TRIGGER: ICD-10-CM

## 2025-06-23 DIAGNOSIS — M25.511 CHRONIC RIGHT SHOULDER PAIN: ICD-10-CM

## 2025-06-23 DIAGNOSIS — G89.29 CHRONIC RIGHT SHOULDER PAIN: ICD-10-CM

## 2025-06-23 PROCEDURE — 99214 OFFICE O/P EST MOD 30 MIN: CPT | Performed by: NURSE PRACTITIONER

## 2025-06-23 PROCEDURE — 3078F DIAST BP <80 MM HG: CPT | Performed by: NURSE PRACTITIONER

## 2025-06-23 PROCEDURE — 3074F SYST BP LT 130 MM HG: CPT | Performed by: NURSE PRACTITIONER

## 2025-06-23 RX ORDER — MONTELUKAST SODIUM 10 MG/1
10 TABLET ORAL AT BEDTIME
Qty: 30 TABLET | Refills: 3 | Status: SHIPPED | OUTPATIENT
Start: 2025-06-23

## 2025-06-23 NOTE — PATIENT INSTRUCTIONS
How to Take Your Medication Before Surgery  Preoperative Medication Instructions   Antiplatelet or Anticoagulation Medication Instructions   - We reviewed the medication list and the patient is not on an antiplatelet or anticoagulation medications.    Additional Medication Instructions  Take all scheduled medications on the day of surgery EXCEPT for modifications listed below:   - Herbal medications and vitamins: DO NOT TAKE 14 days prior to surgery.   - ibuprofen (Advil, Motrin): DO NOT TAKE 1 day before surgery.   Ask GI doctor about Humira, we recommend hold 2 weeks prior and resuming at a minimum 14 days prior       Patient Education   Preparing for Your Surgery  For Adults  Getting started  In most cases, a nurse will call to review your health history and instructions. They will give you an arrival time based on your scheduled surgery time. Please be ready to share:  Your doctor's clinic name and phone number  Your medical, surgical, and anesthesia history  A list of allergies and sensitivities  A list of medicines, including herbal treatments and over-the-counter drugs  Whether the patient has a legal guardian (ask how to send us the papers in advance)  Note: You may not receive a call if you were seen at our PAC (Preoperative Assessment Center).  Please tell us if you're pregnant--or if there's any chance you might be pregnant. Some surgeries may injure a fetus (unborn baby), so they require a pregnancy test. Surgeries that are safe for a fetus don't always need a test, and you can choose whether to have one.   Preparing for surgery  Within 10 to 30 days of surgery: Have a pre-op exam (sometimes called an H&P, or History and Physical). This can be done at a clinic or pre-operative center.  If you're having a , you may not need this exam. Talk to your care team.  At your pre-op exam, talk to your care team about all medicines you take. (This includes CBD oil and any drugs, such as THC, marijuana, and  other forms of cannabis.) If you need to stop any medicine before surgery, ask when to start taking it again.  This is for your safety. Many medicines and drugs can make you bleed too much during surgery. Some change how well surgery (anesthesia) drugs work.  Call your insurance company to let them know you're having surgery. (If you don't have insurance, call [phone].)  Call your clinic if there's any change in your health. This includes a scrape or scratch near the surgery site, or any signs of a cold (sore throat, runny nose, cough, rash, fever).  Eating and drinking guidelines  For your safety: Unless your surgeon tells you otherwise, follow the guidelines below.  Eat and drink as normal until 8 hours before you arrive for surgery. After that, no food or milk. You can spit out gum when you arrive.  Drink clear liquids until 2 hours before you arrive. These are liquids you can see through, like water, Gatorade, and Propel Water. They also include plain black coffee and tea (no cream or milk).  No alcohol for 24 hours before you arrive. The night before surgery, stop any drinks that contain THC.  If your care team tells you to take medicine on the morning of surgery, it's okay to take it with a sip of water. No other medicines or drugs are allowed (including CBD oil)--follow your care team's instructions.  If you have questions the day of surgery, call your hospital or surgery center.   Preventing infection  Shower or bathe the night before and the morning of surgery. Follow the instructions your clinic gave you. (If no instructions, use regular soap.)  Don't shave or clip hair near your surgery site. We'll remove the hair if needed.  Don't smoke or vape the morning of surgery. No chewing tobacco for 6 hours before you arrive. A nicotine patch is okay. You may spit out nicotine gum when you arrive.  For some surgeries, the surgeon will tell you to fully quit smoking and nicotine.  We will make every effort to  keep you safe from infection. We will:  Clean our hands often with soap and water (or an alcohol-based hand rub).  Clean the skin at your surgery site with a special soap that kills germs.  Give you a special gown to keep you warm. (Cold raises the risk of infection.)  Wear hair covers, masks, gowns, and gloves during surgery.  Give antibiotic medicine, if prescribed. Not all surgeries need this medicine.  What to bring on the day of surgery  Photo ID and insurance card  Copy of your health care directive, if you have one  Glasses and hearing aids (bring cases)  You can't wear contacts during surgery  Inhaler and eye drops, if you use them (tell us about these when you arrive)  CPAP machine or breathing device, if you use them  A few personal items, if spending the night  If you have . . .  A pacemaker, ICD (cardiac defibrillator), or other implant: Bring the ID card.  An implanted stimulator: Bring the remote control.  A legal guardian: Bring a copy of the certified (court-stamped) guardianship papers.  Please remove any jewelry, including body piercings. Leave jewelry and other valuables at home.  If you're going home the day of surgery  You must have a support person drive you home. They should stay with you overnight, and they may need to help with your self-care.  If you don't have a support person, please tells us as soon as possible. We can help.  After surgery  If it's hard to control your pain or you need more pain medicine, please call your surgeon's office.  Questions?   If you have any questions for your care team, list them here:   ____________________________________________________________________________________________________________________________________________________________________________________________________________________________________________________________  For informational purposes only. Not to replace the advice of your health care provider. Copyright   2003, 2019 [facility]. All rights reserved. Clinically reviewed by Sunny Huffman MD. [employer] [id number] - REV 02/25.

## 2025-06-23 NOTE — PROGRESS NOTES
Preoperative Evaluation  [facility]  [address] [address]  Phone: [phone]  Fax: [phone]  Primary Provider: Nel Hassan MD  Pre-op Performing Provider: CHARLINE Ramirez CNP  Jun 23, 2025 6/23/2025   Surgical Information   What procedure is being done? RIGHT SHOULDER ARTHROSCOPIC ROTATOR CUFF TEAR WITH POSSIBLE BIOINDUCTIVE IMPLANT    Facility or Hospital where procedure/surgery will be performed: [facility]    Who is doing the procedure / surgery? Pérez Campos, DO    Date of surgery / procedure: July 16, 2025   Time of surgery / procedure: TBD   Where do you plan to recover after surgery? at home with family     Fax number for surgical facility: [phone]    Assessment & Plan     The proposed surgical procedure is considered INTERMEDIATE risk.    Preop general physical exam  She is cleared for surgery.  No cardiac history without significant arrhythmia and no new or concerning symptoms.  Recent lab results reviewed and without findings of kidney dysfunction, electrolyte imbalance, infection or anemia.    Chronic right shoulder pain  Cleared for surgery    Crohn's disease of both small and large intestine with fistula (H)  Followed by GI.  No current flare.  Recommend she hold Humira 2 weeks prior but she will confirm with the gastroenterologist this week.    Allergic rhinitis, unspecified seasonality, unspecified trigger  She has significant environmental allergies, using cetirizine daily as well as steroid nasal spray over-the-counter.  She has seasonal allergies but also reports pet dander allergy.  History of immunotherapy.  Has appointment in the fall with allergist.  Suggested Singulair we discussed potential side effects including black box warning of psychiatric symptoms.  - montelukast (SINGULAIR) 10 MG tablet; Take 1 tablet (10 mg) by mouth at bedtime.           Risks and  Recommendations  The patient has the following additional risks and recommendations for perioperative complications:  Infection:    - immunosuppressed on humira    Preoperative Medication Instructions  Antiplatelet or Anticoagulation Medication Instructions   - We reviewed the medication list and the patient is not on an antiplatelet or anticoagulation medications.    Additional Medication Instructions  Take all scheduled medications on the day of surgery EXCEPT for modifications listed below:   - Herbal medications and vitamins: DO NOT TAKE 14 days prior to surgery.   - ibuprofen (Advil, Motrin): DO NOT TAKE 1 day before surgery.   Ask GI doctor about Humira, we recommend hold 2 weeks prior and resuming at a minimum 14 days prior    Recommendation  Approval given to proceed with proposed procedure, without further diagnostic evaluation.        Subjective   Jenae is a 50 year old, presenting for the following: patient is here for preoperative clearance  Pre-Op Exam          6/23/2025     9:17 AM   Additional Questions   Roomed by javier golden   Accompanied by self         6/23/2025   Forms   Any forms needing to be completed Yes     HPI:   Jenae is a pleasant 50-year-old female with Crohn's disease and allergies who presents today for preop evaluation for right rotator cuff repair.            6/23/2025   Pre-Op Questionnaire   Have you ever had a heart attack or stroke? No   Have you ever had surgery on your heart or blood vessels, such as a stent placement, a coronary artery bypass, or surgery on an artery in your head, neck, heart, or legs? No   Do you have chest pain with activity? No   Do you have a history of heart failure? No   Do you currently have a cold, bronchitis or symptoms of other infection? No   Do you have a cough, shortness of breath, or wheezing? No   Do you or anyone in your family have previous history of blood clots? No   Do you or does anyone in your family have a serious bleeding problem such  as prolonged bleeding following surgeries or cuts? No   Have you ever had problems with anemia or been told to take iron pills? (!) YES CBC 6 lfores okay, and no new symptoms   Have you had any abnormal blood loss such as black, tarry or bloody stools, or abnormal vaginal bleeding? No   Have you ever had a blood transfusion? No   Are you willing to have a blood transfusion if it is medically needed before, during, or after your surgery? Yes   Have you or any of your relatives ever had problems with anesthesia? No   Do you have sleep apnea, excessive snoring or daytime drowsiness? No   Do you have any artifical heart valves or other implanted medical devices like a pacemaker, defibrillator, or continuous glucose monitor? No   Do you have artificial joints? No   Are you allergic to latex? No     Advance Care Planning    Discussed advance care planning with patient; however, patient declined at this time.    Preoperative Review of    reviewed - no record of controlled substances prescribed.          Patient Active Problem List    Diagnosis Date Noted    Impetigo 05/14/2025     Priority: Medium    Need for vaccination 05/14/2025     Priority: Medium      No past medical history on file.  No past surgical history on file.  Current Outpatient Medications   Medication Sig Dispense Refill    betamethasone dipropionate (DIPROSONE) 0.05 % external ointment APPLY THIN LAYER TOPICALLY TO AFFECTED AREAS EVERY TWO DAYS      calcium carbonate (OS-KEMAL) 1500 (600 Ca) MG tablet Take by mouth every 24 hours      cetirizine (ZYRTEC) 10 MG tablet Take 10 mg by mouth daily as needed for allergies.      Levonorgestrel (MIRENA, 52 MG, IU)   Intrauteral, once, 0 Refill(s), Type: Maintenance      multivitamin w/minerals (MULTI-VITAMIN) tablet Take 1 tablet by mouth daily.      adalimumab (HUMIRA *CF* PEN) 40 MG/0.4ML pen kit       escitalopram (LEXAPRO) 20 MG tablet Take 1 tablet by mouth every morning (Patient not taking: Reported on  6/23/2025)      mupirocin (BACTROBAN) 2 % external ointment Apply topically 3 times daily. 30 g 0    Probiotic Product (ALIGN) CAPS take 1 capsule by oral route  every day (Patient not taking: Reported on 6/23/2025)      triamcinolone (KENALOG) 0.1 % external cream Apply topically 3 times daily. APPLY  TOPICALLY TO AFFECTED AREA(S) THREE TIMES DAILY IF NEEDED 45 g 0    vitamin D3 (CHOLECALCIFEROL) 50 mcg (2000 units) tablet Take 1 tablet by mouth daily         Allergies   Allergen Reactions    Nuts      Other reaction(s): Unknown    Pine Itching    Apple     Apple Juice     Bacitracin      Other reaction(s): *Unknown, Unknown  Per the allergist  Per the allergist      Benzocaine      Other reaction(s): *Unknown, Unknown  Patch test allergy to rachna mix (benzocaine, tetracaine, dibucaine).  Patch test allergy to rachna mix (benzocaine, tetracaine, dibucaine).      Cherry      Other reaction(s): *Unknown, throat swelling, eye swelling    Infliximab Itching     Other reaction(s): Itching    Neomycin      Other reaction(s): *Unknown, Unknown  Patch test allergy to neomycin  Patch test allergy to neomycin      Prunus Persica      Other reaction(s): *Unknown  Other reaction(s): throat swelling, eye swelling          Social History     Tobacco Use    Smoking status: Never     Passive exposure: Never    Smokeless tobacco: Never   Substance Use Topics    Alcohol use: Not on file       History   Drug Use Not on file             Review of Systems  CONSTITUTIONAL: NEGATIVE for fever, chills, change in weight  INTEGUMENTARY/SKIN: NEGATIVE for worrisome rashes, moles or lesions  EYES: NEGATIVE for vision changes or irritation  ENT/MOUTH: Positive for rhinorrhea and nasal congestion otherwise NEGATIVE for ear, mouth and throat problems  RESP: NEGATIVE for significant cough or SOB  BREAST: NEGATIVE for masses, tenderness or discharge  CV: NEGATIVE for chest pain, palpitations or peripheral edema  GI: NEGATIVE for nausea, abdominal  "pain, heartburn, or change in bowel habits  : NEGATIVE for frequency, dysuria, or hematuria  MUSCULOSKELETAL: Positive for right shoulder pain otherwise NEGATIVE for significant arthralgias or myalgia  NEURO: NEGATIVE for weakness, dizziness or paresthesias  ENDOCRINE: NEGATIVE for temperature intolerance, skin/hair changes  HEME: NEGATIVE for bleeding problems  PSYCHIATRIC: NEGATIVE for changes in mood or affect    Objective    /74 (BP Location: Right arm, Patient Position: Sitting)   Pulse 86   Temp 98  F (36.7  C)   Resp 16   Ht 1.6 m (5' 3\")   Wt 77.9 kg (171 lb 11.2 oz)   LMP  (LMP Unknown)   SpO2 99%   Breastfeeding No   BMI 30.42 kg/m     Estimated body mass index is 30.42 kg/m  as calculated from the following:    Height as of this encounter: 1.6 m (5' 3\").    Weight as of this encounter: 77.9 kg (171 lb 11.2 oz).  Physical Exam  GENERAL: alert and no distress  EYES: Eyes grossly normal to inspection, PERRL and conjunctivae and sclerae normal  HENT: ear canals and TM's normal, nose and mouth without ulcers or lesions  NECK: no adenopathy, no asymmetry, masses, or scars  RESP: lungs clear to auscultation - no rales, rhonchi or wheezes  CV: regular rate and rhythm, normal S1 S2, no S3 or S4, no murmur, click or rub, no peripheral edema  ABDOMEN: soft, nontender, no hepatosplenomegaly, no masses and bowel sounds normal  MS: no gross musculoskeletal defects noted, no edema  SKIN: no suspicious lesions or rashes  NEURO: Normal strength and tone, mentation intact and speech normal  PSYCH: mentation appears normal, affect normal/bright    Recent Labs   Lab Test 06/13/25  0738      POTASSIUM 4.4   CR 0.83        Diagnostics  No labs were ordered during this visit.   No EKG required, no history of coronary heart disease, significant arrhythmia, peripheral arterial disease or other structural heart disease.    Revised Cardiac Risk Index (RCRI)  The patient has the following serious " cardiovascular risks for perioperative complications:   - No serious cardiac risks = 0 points     RCRI Interpretation: 0 points: Class I (very low risk - 0.4% complication rate)         Signed Electronically by: CHARLINE Ramirez CNP  A copy of this evaluation report is provided to the requesting physician.

## 2025-06-25 ENCOUNTER — TRANSFERRED RECORDS (OUTPATIENT)
Dept: ADMINISTRATIVE | Facility: CLINIC | Age: 50
End: 2025-06-25
Payer: COMMERCIAL

## 2025-06-26 NOTE — PROGRESS NOTES
_________________________________________________________________________________________________    [address] [address]  [place] [address]  [phone]      Patient:            Jenae Husain   YOB: 1975  Date:                    6/25/2025  Historian:    self  Visit Type:              Office Visit   Rendering Provider:  Yung Ramirez MD    History of Present Illness:    This is a 50-year-old female here for follow-up in regard to Crohn's ileocolitis, fistulizing phenotype.  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  She is currently on Humira 40 mg weekly and notes that this has worked well for her for years.  Her last colonoscopy in 2024 demonstrated endoscopic remission.  She does ask if it might be possible to change back to every other week dosing.  She has a rotator cuff surgery planned for July and mentions that she may need to hold Humira for that surgery.  She is otherwise managing well.  She is doing summer work which has been physical and difficult on her body.  She also notes aggravation of underlying allergies.  She normally does see a dermatologist regularly, including for family history of skin cancer, but notes that she is due for this evaluation.  IBD HISTORY  Diagnosis of Crohn disease over 20 years ago with fistulizing Crohn's including rectovaginal fistula.    MEDICAL THERAPY  Previously on infliximab, but more recently on Humira with frequency adjustment to weekly dosing.    HEALTH MAINTENANCE MEASURES  Skin health: She sees Dermatology regularly    Vaccinations: She is up-to-date on vaccinations, including flu, COVID and shingles. She has also previously received the pneumococcal vaccine.    Assessment/Plan  # Detail Type Description   1. Assessment Crohn's disease of both small and large intestine with fistula (K50.813).     Detail Type Description   Impression This is a 50-year-old female with history of fistulizing Crohn's ileocolitis, clinically doing very  well on Humira 40 mg weekly.  She does have rotator cuff surgery planned for July.  We let her know that the perioperative risk is not likely increased if she were to stay on Humira, but that we typically defer to the surgeon on this (whether or not a hold is required).     Detail Type Description   Patient Plan 1.  Continue Humira as you are doing but check with your orthopedic surgeon to see if the recommendation is for perioperative hold  2.  We will check lab work including a Humira level when you are able  3.  Follow-up with dermatology when able for skin check  4.  We will plan clinic visit in 1 year but call or message with any concerns along the way     Orders  Labs:  Test Comments Timeframe Assessment   Adalimumab Concentration and Anti Adalimumab Antibody  First Available K50.813   CBC W/Diff, Whole Blood  First Available K50.813   Hepatic Function Panel First Available First Available K50.813       Follow-up visit/Referral:  Order Comments   follow-up visit with Yung Ramirez MD 1 Year or by 06/25/2026      If your health care provider has asked for a follow-up visit, your appointment will be scheduled with a nurse practitioner or physician assistant on your provider's care team, unless noted above.      cc:  Nel Garay MD  cc:  Nel Garay MD    Electronically Signed by:  Yung Ramirez MD  06/25/2025 12:29 PM      Medications:  Medication Dose Sig Description Comments   Align UNKNOWN take 1 capsule by oral route  every day taking as directed   Calcium 600 mg calcium (1,500 mg) tablet 600 mg calcium (1,500 mg) take 2 by Oral route  every day taking as directed   Centrum 18 mg-400 mcg tablet 18 mg iron-400 mcg take 1 tablet by oral route  every day with food taking as directed   cetirizine 10 mg tablet 10 mg take 1 tablet by oral route  every day as needed taking as directed   Flonase Allergy Relief 50 mcg/actuation nasal spray,suspension 50 mcg/actuation spray 1 - 2 spray by intranasal route   every day in each nostril as needed taking as directed   Humira(CF) Pen 40 mg/0.4 mL subcutaneous kit 40 mg/0.4 mL inject 0.4 milliliter by subcutaneous route  every week in the abdomen or thigh (rotate sites) taking as directed   Singulair 10 mg tablet 10 mg take 1 tablet by ORAL route  every day in the evening    Vitamin D3 1,000 unit tablet 25 mcg (1,000 unit) take 1 Days by Oral route  every day taking as directed     Allergies:  Medication Name Ingredient Reaction Comment    CHERRY      APPLE      PEACH      NECTARINE      POLLEN EXTRACTS      Environmental Allergies     Inflectra INFLIXIMAB-DYYB Itching      Completed Immunization    Pneumo (2 yrs or older)(PPV)    Hep A and Hep B    Pneumococcal conjugate PCV 13    Hep A and Hep B   Vitals:  BP mm/Hg Pulse/min Resp/min Temp F Height (Total in.) Weight (lbs.) Weight (oz.) BMI   139/72 86   63.00 172.40  30.54     Smoking Status:    Use Status Type Smoking Status Usage Per Day Years Used Total Pack Years   no/never  Never smoker      no/never  Never smoker      no/never  Never smoker      no/never  undefined        Race:  White  Ethnicity:  Not  or   Preferred Language:  English

## 2025-07-02 ENCOUNTER — TRANSFERRED RECORDS (OUTPATIENT)
Dept: ADMINISTRATIVE | Facility: CLINIC | Age: 50
End: 2025-07-02
Payer: COMMERCIAL

## 2025-07-03 NOTE — PROGRESS NOTES
2025        Jenae Husain   [address] [place] [address]      Jenae Lisha,  :  1975    I'm writing to let you know about the tests that were taken recently.   Thank you for allowing [facility] the opportunity to take part in your healthcare.  At [facility] we strive to provide each patient with the finest gastroenterology care available.  We hope your experience was pleasant and informative.    The available lab results (humira level pending) are within acceptable limits.      Thank you.    Electronically signed by:  Yung Ramirez MD 2025 03:35 PM  Document generated by:  Yung Ramirez MD  2025  If your provider ordered multiple tests; the results may not become available at the same time.  If multiple test results are received within 14 days of one another, you may receive a duplicate.  cc:  Nel Garay MD

## 2025-07-10 ENCOUNTER — TRANSFERRED RECORDS (OUTPATIENT)
Dept: ADMINISTRATIVE | Facility: CLINIC | Age: 50
End: 2025-07-10
Payer: COMMERCIAL

## 2025-07-11 NOTE — PROGRESS NOTES
07/10/2025        Jenae Lisha   [address] [place] [address]      Jenae Husain,  :  1975    I'm writing to let you know about the tests that were taken recently.   Thank you for allowing [facility] the opportunity to take part in your healthcare.  At [facility] we strive to provide each patient with the finest gastroenterology care available.  We hope your experience was pleasant and informative.    The Humira level is acceptable but likely higher than what is needed and there are no antibodies detected.  To your question, it is reasonable to try going back to every other week, but call with any recurrent symptoms or with any questions otherwise.      Thank you.    Electronically signed by:  Yung Ramirez MD 07/10/2025 03:57 PM  Document generated by:  Yung Ramirez MD  07/10/2025  If your provider ordered multiple tests; the results may not become available at the same time.  If multiple test results are received within 14 days of one another, you may receive a duplicate.  cc:  Nel Garay MD